# Patient Record
Sex: MALE | Race: WHITE | NOT HISPANIC OR LATINO | Employment: FULL TIME | URBAN - METROPOLITAN AREA
[De-identification: names, ages, dates, MRNs, and addresses within clinical notes are randomized per-mention and may not be internally consistent; named-entity substitution may affect disease eponyms.]

---

## 2023-11-01 ENCOUNTER — APPOINTMENT (EMERGENCY)
Dept: RADIOLOGY | Facility: HOSPITAL | Age: 53
DRG: 371 | End: 2023-11-01
Attending: EMERGENCY MEDICINE
Payer: COMMERCIAL

## 2023-11-01 ENCOUNTER — HOSPITAL ENCOUNTER (INPATIENT)
Facility: HOSPITAL | Age: 53
LOS: 11 days | Discharge: HOME | DRG: 371 | End: 2023-11-12
Attending: EMERGENCY MEDICINE | Admitting: INTERNAL MEDICINE
Payer: COMMERCIAL

## 2023-11-01 DIAGNOSIS — N17.9 AKI (ACUTE KIDNEY INJURY) (CMS/HCC): ICD-10-CM

## 2023-11-01 DIAGNOSIS — E87.1 HYPONATREMIA: Primary | ICD-10-CM

## 2023-11-01 DIAGNOSIS — E86.0 DEHYDRATION: ICD-10-CM

## 2023-11-01 DIAGNOSIS — R19.7 DIARRHEA, UNSPECIFIED TYPE: ICD-10-CM

## 2023-11-01 PROBLEM — J45.909 ASTHMA: Status: ACTIVE | Noted: 2023-11-01

## 2023-11-01 PROBLEM — E11.9 TYPE 2 DIABETES MELLITUS (CMS/HCC): Status: ACTIVE | Noted: 2023-11-01

## 2023-11-01 PROBLEM — E87.29 HIGH ANION GAP METABOLIC ACIDOSIS: Status: ACTIVE | Noted: 2023-11-01

## 2023-11-01 PROBLEM — E78.5 HYPERLIPIDEMIA: Status: ACTIVE | Noted: 2023-11-01

## 2023-11-01 PROBLEM — I10 HYPERTENSION: Status: ACTIVE | Noted: 2023-11-01

## 2023-11-01 LAB
ALBUMIN SERPL-MCNC: 4.2 G/DL (ref 3.5–5.7)
ALP SERPL-CCNC: 95 IU/L (ref 34–125)
ALT SERPL-CCNC: 9 IU/L (ref 7–52)
ANION GAP SERPL CALC-SCNC: 13 MEQ/L (ref 3–15)
ANION GAP SERPL CALC-SCNC: 18 MEQ/L (ref 3–15)
AST SERPL-CCNC: 13 IU/L (ref 13–39)
ATRIAL RATE: 89
BACTERIA URNS QL MICRO: ABNORMAL /HPF
BASOPHILS # BLD: 0.05 K/UL (ref 0.01–0.1)
BASOPHILS NFR BLD: 0.5 %
BILIRUB SERPL-MCNC: 0.3 MG/DL (ref 0.3–1.2)
BILIRUB UR QL STRIP.AUTO: NEGATIVE MG/DL
BUN SERPL-MCNC: 120 MG/DL (ref 7–25)
BUN SERPL-MCNC: 138 MG/DL (ref 7–25)
CALCIUM SERPL-MCNC: 7.7 MG/DL (ref 8.6–10.3)
CALCIUM SERPL-MCNC: 8.7 MG/DL (ref 8.6–10.3)
CHLORIDE SERPL-SCNC: 102 MEQ/L (ref 98–107)
CHLORIDE SERPL-SCNC: 109 MEQ/L (ref 98–107)
CLARITY UR REFRACT.AUTO: CLEAR
CO2 SERPL-SCNC: 11 MEQ/L (ref 21–31)
CO2 SERPL-SCNC: 9 MEQ/L (ref 21–31)
COLOR UR AUTO: YELLOW
CREAT SERPL-MCNC: 8.4 MG/DL (ref 0.7–1.3)
CREAT SERPL-MCNC: 9.4 MG/DL (ref 0.7–1.3)
DIFFERENTIAL METHOD BLD: ABNORMAL
EOSINOPHIL # BLD: 0.45 K/UL (ref 0.04–0.54)
EOSINOPHIL NFR BLD: 4.7 %
ERYTHROCYTE [DISTWIDTH] IN BLOOD BY AUTOMATED COUNT: 14.4 % (ref 11.6–14.4)
FLUAV RNA SPEC QL NAA+PROBE: NEGATIVE
FLUBV RNA SPEC QL NAA+PROBE: NEGATIVE
GFR SERPL CREATININE-BSD FRML MDRD: 5.9 ML/MIN/1.73M*2
GFR SERPL CREATININE-BSD FRML MDRD: 6.7 ML/MIN/1.73M*2
GLUCOSE BLD-MCNC: 149 MG/DL (ref 70–99)
GLUCOSE BLD-MCNC: 228 MG/DL (ref 70–99)
GLUCOSE SERPL-MCNC: 120 MG/DL (ref 70–99)
GLUCOSE SERPL-MCNC: 140 MG/DL (ref 70–99)
GLUCOSE UR STRIP.AUTO-MCNC: ABNORMAL MG/DL
HCT VFR BLDCO AUTO: 43.5 % (ref 40.1–51)
HGB BLD-MCNC: 14.9 G/DL (ref 13.7–17.5)
HGB UR QL STRIP.AUTO: NEGATIVE
HYALINE CASTS #/AREA URNS LPF: ABNORMAL /LPF
IMM GRANULOCYTES # BLD AUTO: 0.05 K/UL (ref 0–0.08)
IMM GRANULOCYTES NFR BLD AUTO: 0.5 %
KETONES UR STRIP.AUTO-MCNC: NEGATIVE MG/DL
LACTATE SERPL-SCNC: 1 MMOL/L (ref 0.4–2)
LEUKOCYTE ESTERASE UR QL STRIP.AUTO: NEGATIVE
LIPASE SERPL-CCNC: 877 U/L (ref 11–82)
LYMPHOCYTES # BLD: 1.37 K/UL (ref 1.2–3.5)
LYMPHOCYTES NFR BLD: 14.3 %
MAGNESIUM SERPL-MCNC: 2.2 MG/DL (ref 1.8–2.5)
MAGNESIUM SERPL-MCNC: 2.7 MG/DL (ref 1.8–2.5)
MCH RBC QN AUTO: 27.3 PG (ref 28–33.2)
MCHC RBC AUTO-ENTMCNC: 34.3 G/DL (ref 32.2–36.5)
MCV RBC AUTO: 79.8 FL (ref 83–98)
MONOCYTES # BLD: 0.83 K/UL (ref 0.3–1)
MONOCYTES NFR BLD: 8.7 %
MUCOUS THREADS URNS QL MICRO: ABNORMAL /LPF
NEUTROPHILS # BLD: 6.82 K/UL (ref 1.7–7)
NEUTS SEG NFR BLD: 71.3 %
NITRITE UR QL STRIP.AUTO: NEGATIVE
NRBC BLD-RTO: 0 %
P AXIS: 56
PDW BLD AUTO: 9.3 FL (ref 9.4–12.4)
PH UR STRIP.AUTO: 5.5 [PH]
PHOSPHATE SERPL-MCNC: 8.2 MG/DL (ref 2.4–4.7)
PHOSPHATE SERPL-MCNC: 9.9 MG/DL (ref 2.4–4.7)
PLATELET # BLD AUTO: 295 K/UL (ref 150–350)
POCT TEST: ABNORMAL
POCT TEST: ABNORMAL
POTASSIUM SERPL-SCNC: 4.4 MEQ/L (ref 3.5–5.1)
POTASSIUM SERPL-SCNC: 5.3 MEQ/L (ref 3.5–5.1)
PR INTERVAL: 180
PROT SERPL-MCNC: 7.8 G/DL (ref 6–8.2)
PROT UR QL STRIP.AUTO: 1
QRS DURATION: 92
QT INTERVAL: 362
QTC CALCULATION(BAZETT): 440
R AXIS: -7
RBC # BLD AUTO: 5.45 M/UL (ref 4.5–5.8)
RBC #/AREA URNS HPF: ABNORMAL /HPF
RSV RNA SPEC QL NAA+PROBE: NEGATIVE
SARS-COV-2 RNA RESP QL NAA+PROBE: NEGATIVE
SODIUM SERPL-SCNC: 129 MEQ/L (ref 136–145)
SODIUM SERPL-SCNC: 133 MEQ/L (ref 136–145)
SP GR UR REFRACT.AUTO: 1.01
SQUAMOUS URNS QL MICRO: ABNORMAL /HPF
T WAVE AXIS: 39
UROBILINOGEN UR STRIP-ACNC: 0.2 EU/DL
VENTRICULAR RATE: 89
WBC # BLD AUTO: 9.57 K/UL (ref 3.8–10.5)
WBC #/AREA URNS HPF: ABNORMAL /HPF

## 2023-11-01 PROCEDURE — 99223 1ST HOSP IP/OBS HIGH 75: CPT | Performed by: STUDENT IN AN ORGANIZED HEALTH CARE EDUCATION/TRAINING PROGRAM

## 2023-11-01 PROCEDURE — 85025 COMPLETE CBC W/AUTO DIFF WBC: CPT | Performed by: EMERGENCY MEDICINE

## 2023-11-01 PROCEDURE — 99291 CRITICAL CARE FIRST HOUR: CPT | Mod: 25

## 2023-11-01 PROCEDURE — 25800000 HC PHARMACY IV SOLUTIONS: Performed by: EMERGENCY MEDICINE

## 2023-11-01 PROCEDURE — 96374 THER/PROPH/DIAG INJ IV PUSH: CPT | Mod: 59

## 2023-11-01 PROCEDURE — 83605 ASSAY OF LACTIC ACID: CPT | Performed by: EMERGENCY MEDICINE

## 2023-11-01 PROCEDURE — 80053 COMPREHEN METABOLIC PANEL: CPT | Performed by: EMERGENCY MEDICINE

## 2023-11-01 PROCEDURE — 83735 ASSAY OF MAGNESIUM: CPT | Performed by: EMERGENCY MEDICINE

## 2023-11-01 PROCEDURE — 36415 COLL VENOUS BLD VENIPUNCTURE: CPT | Performed by: EMERGENCY MEDICINE

## 2023-11-01 PROCEDURE — 81003 URINALYSIS AUTO W/O SCOPE: CPT | Performed by: EMERGENCY MEDICINE

## 2023-11-01 PROCEDURE — 63600000 HC DRUGS/DETAIL CODE: Mod: JZ | Performed by: EMERGENCY MEDICINE

## 2023-11-01 PROCEDURE — 80048 BASIC METABOLIC PNL TOTAL CA: CPT

## 2023-11-01 PROCEDURE — 83690 ASSAY OF LIPASE: CPT | Performed by: EMERGENCY MEDICINE

## 2023-11-01 PROCEDURE — 25000000 HC PHARMACY GENERAL: Performed by: EMERGENCY MEDICINE

## 2023-11-01 PROCEDURE — 21400000 HC ROOM AND CARE CCU/INTERMEDIATE

## 2023-11-01 PROCEDURE — 63600000 HC DRUGS/DETAIL CODE

## 2023-11-01 PROCEDURE — 83735 ASSAY OF MAGNESIUM: CPT

## 2023-11-01 PROCEDURE — G1004 CDSM NDSC: HCPCS

## 2023-11-01 PROCEDURE — 93005 ELECTROCARDIOGRAM TRACING: CPT | Performed by: EMERGENCY MEDICINE

## 2023-11-01 PROCEDURE — 87637 SARSCOV2&INF A&B&RSV AMP PRB: CPT | Performed by: EMERGENCY MEDICINE

## 2023-11-01 PROCEDURE — 99285 EMERGENCY DEPT VISIT HI MDM: CPT | Mod: 25

## 2023-11-01 PROCEDURE — 84100 ASSAY OF PHOSPHORUS: CPT

## 2023-11-01 PROCEDURE — 93010 ELECTROCARDIOGRAM REPORT: CPT | Performed by: INTERNAL MEDICINE

## 2023-11-01 PROCEDURE — 96361 HYDRATE IV INFUSION ADD-ON: CPT

## 2023-11-01 RX ORDER — DEXTROSE 50 % IN WATER (D50W) INTRAVENOUS SYRINGE
25 AS NEEDED
Status: DISCONTINUED | OUTPATIENT
Start: 2023-11-01 | End: 2023-11-12 | Stop reason: HOSPADM

## 2023-11-01 RX ORDER — LISINOPRIL 20 MG/1
20 TABLET ORAL DAILY
COMMUNITY
End: 2023-11-12 | Stop reason: HOSPADM

## 2023-11-01 RX ORDER — ONDANSETRON HYDROCHLORIDE 2 MG/ML
4 INJECTION, SOLUTION INTRAVENOUS ONCE
Status: COMPLETED | OUTPATIENT
Start: 2023-11-01 | End: 2023-11-01

## 2023-11-01 RX ORDER — IBUPROFEN 200 MG
16-32 TABLET ORAL AS NEEDED
Status: DISCONTINUED | OUTPATIENT
Start: 2023-11-01 | End: 2023-11-12 | Stop reason: HOSPADM

## 2023-11-01 RX ORDER — DEXTROSE 40 %
15-30 GEL (GRAM) ORAL AS NEEDED
Status: DISCONTINUED | OUTPATIENT
Start: 2023-11-01 | End: 2023-11-12 | Stop reason: HOSPADM

## 2023-11-01 RX ORDER — INSULIN LISPRO 100 [IU]/ML
3-5 INJECTION, SOLUTION INTRAVENOUS; SUBCUTANEOUS
Status: DISCONTINUED | OUTPATIENT
Start: 2023-11-01 | End: 2023-11-12 | Stop reason: HOSPADM

## 2023-11-01 RX ORDER — LATANOPROST 50 UG/ML
1 SOLUTION/ DROPS OPHTHALMIC NIGHTLY
COMMUNITY

## 2023-11-01 RX ORDER — ACETAMINOPHEN 325 MG/1
650 TABLET ORAL EVERY 4 HOURS PRN
Status: DISCONTINUED | OUTPATIENT
Start: 2023-11-01 | End: 2023-11-12 | Stop reason: HOSPADM

## 2023-11-01 RX ORDER — PIOGLITAZONEHYDROCHLORIDE 15 MG/1
15 TABLET ORAL DAILY
COMMUNITY

## 2023-11-01 RX ORDER — EMPAGLIFLOZIN AND METFORMIN HYDROCHLORIDE 12.5; 1 MG/1; MG/1
1 TABLET ORAL 2 TIMES DAILY
COMMUNITY

## 2023-11-01 RX ORDER — ATORVASTATIN CALCIUM 40 MG/1
40 TABLET, FILM COATED ORAL EVERY EVENING
COMMUNITY

## 2023-11-01 RX ORDER — DULAGLUTIDE 4.5 MG/.5ML
4.5 INJECTION, SOLUTION SUBCUTANEOUS
COMMUNITY

## 2023-11-01 RX ORDER — MONTELUKAST SODIUM 10 MG/1
TABLET ORAL NIGHTLY
COMMUNITY

## 2023-11-01 RX ORDER — HEPARIN SODIUM 5000 [USP'U]/ML
5000 INJECTION, SOLUTION INTRAVENOUS; SUBCUTANEOUS EVERY 8 HOURS
Status: DISCONTINUED | OUTPATIENT
Start: 2023-11-01 | End: 2023-11-12 | Stop reason: HOSPADM

## 2023-11-01 RX ORDER — SODIUM CHLORIDE, SODIUM LACTATE, POTASSIUM CHLORIDE, CALCIUM CHLORIDE 600; 310; 30; 20 MG/100ML; MG/100ML; MG/100ML; MG/100ML
INJECTION, SOLUTION INTRAVENOUS CONTINUOUS
Status: DISCONTINUED | OUTPATIENT
Start: 2023-11-01 | End: 2023-11-01

## 2023-11-01 RX ADMIN — SODIUM BICARBONATE: 84 INJECTION, SOLUTION INTRAVENOUS at 18:00

## 2023-11-01 RX ADMIN — ONDANSETRON 4 MG: 2 INJECTION INTRAMUSCULAR; INTRAVENOUS at 13:52

## 2023-11-01 RX ADMIN — INSULIN LISPRO 3 UNITS: 100 INJECTION, SOLUTION INTRAVENOUS; SUBCUTANEOUS at 23:25

## 2023-11-01 RX ADMIN — SODIUM CHLORIDE 2000 ML: 9 INJECTION, SOLUTION INTRAVENOUS at 13:39

## 2023-11-01 RX ADMIN — HEPARIN SODIUM 5000 UNITS: 5000 INJECTION, SOLUTION INTRAVENOUS; SUBCUTANEOUS at 23:26

## 2023-11-01 RX ADMIN — SODIUM CHLORIDE 1000 ML: 9 INJECTION, SOLUTION INTRAVENOUS at 16:12

## 2023-11-02 LAB
ANION GAP SERPL CALC-SCNC: 10 MEQ/L (ref 3–15)
ANION GAP SERPL CALC-SCNC: 15 MEQ/L (ref 3–15)
BUN SERPL-MCNC: 100 MG/DL (ref 7–25)
BUN SERPL-MCNC: 118 MG/DL (ref 7–25)
C DIFF TOX GENS STL QL NAA+PROBE: NEGATIVE
CALCIUM SERPL-MCNC: 7 MG/DL (ref 8.6–10.3)
CALCIUM SERPL-MCNC: 7.8 MG/DL (ref 8.6–10.3)
CHLORIDE SERPL-SCNC: 100 MEQ/L (ref 98–107)
CHLORIDE SERPL-SCNC: 110 MEQ/L (ref 98–107)
CHLORIDE UR-SCNC: 30 MEQ/L
CO2 SERPL-SCNC: 11 MEQ/L (ref 21–31)
CO2 SERPL-SCNC: 25 MEQ/L (ref 21–31)
CREAT SERPL-MCNC: 6 MG/DL (ref 0.7–1.3)
CREAT SERPL-MCNC: 8 MG/DL (ref 0.7–1.3)
ERYTHROCYTE [DISTWIDTH] IN BLOOD BY AUTOMATED COUNT: 14.1 % (ref 11.6–14.4)
EST. AVERAGE GLUCOSE BLD GHB EST-MCNC: 174 MG/DL
GFR SERPL CREATININE-BSD FRML MDRD: 7.1 ML/MIN/1.73M*2
GFR SERPL CREATININE-BSD FRML MDRD: 9.8 ML/MIN/1.73M*2
GLUCOSE BLD-MCNC: 152 MG/DL (ref 70–99)
GLUCOSE BLD-MCNC: 163 MG/DL (ref 70–99)
GLUCOSE BLD-MCNC: 196 MG/DL (ref 70–99)
GLUCOSE BLD-MCNC: 271 MG/DL (ref 70–99)
GLUCOSE SERPL-MCNC: 131 MG/DL (ref 70–99)
GLUCOSE SERPL-MCNC: 441 MG/DL (ref 70–99)
HBA1C MFR BLD: 7.7 %
HCT VFR BLDCO AUTO: 39 % (ref 40.1–51)
HGB BLD-MCNC: 13.2 G/DL (ref 13.7–17.5)
LABORATORY COMMENT REPORT: NORMAL
MAGNESIUM SERPL-MCNC: 1.8 MG/DL (ref 1.8–2.5)
MAGNESIUM SERPL-MCNC: 2.3 MG/DL (ref 1.8–2.5)
MCH RBC QN AUTO: 27 PG (ref 28–33.2)
MCHC RBC AUTO-ENTMCNC: 33.8 G/DL (ref 32.2–36.5)
MCV RBC AUTO: 79.8 FL (ref 83–98)
PDW BLD AUTO: 9.6 FL (ref 9.4–12.4)
PHOSPHATE SERPL-MCNC: 7.1 MG/DL (ref 2.4–4.7)
PLATELET # BLD AUTO: 280 K/UL (ref 150–350)
POCT TEST: ABNORMAL
POTASSIUM SERPL-SCNC: 3.2 MEQ/L (ref 3.5–5.1)
POTASSIUM SERPL-SCNC: 4.1 MEQ/L (ref 3.5–5.1)
POTASSIUM UR-SCNC: 4.3 MEQ/L
RBC # BLD AUTO: 4.89 M/UL (ref 4.5–5.8)
SODIUM SERPL-SCNC: 135 MEQ/L (ref 136–145)
SODIUM SERPL-SCNC: 136 MEQ/L (ref 136–145)
SODIUM UR-SCNC: 31 MEQ/L
WBC # BLD AUTO: 9.85 K/UL (ref 3.8–10.5)

## 2023-11-02 PROCEDURE — 87045 FECES CULTURE AEROBIC BACT: CPT

## 2023-11-02 PROCEDURE — 84100 ASSAY OF PHOSPHORUS: CPT

## 2023-11-02 PROCEDURE — 83036 HEMOGLOBIN GLYCOSYLATED A1C: CPT

## 2023-11-02 PROCEDURE — 87493 C DIFF AMPLIFIED PROBE: CPT

## 2023-11-02 PROCEDURE — 85027 COMPLETE CBC AUTOMATED: CPT

## 2023-11-02 PROCEDURE — 63600000 HC DRUGS/DETAIL CODE

## 2023-11-02 PROCEDURE — 21400000 HC ROOM AND CARE CCU/INTERMEDIATE

## 2023-11-02 PROCEDURE — 99233 SBSQ HOSP IP/OBS HIGH 50: CPT | Performed by: HOSPITALIST

## 2023-11-02 PROCEDURE — 83735 ASSAY OF MAGNESIUM: CPT

## 2023-11-02 PROCEDURE — 63700000 HC SELF-ADMINISTRABLE DRUG

## 2023-11-02 PROCEDURE — 36415 COLL VENOUS BLD VENIPUNCTURE: CPT

## 2023-11-02 PROCEDURE — 87046 STOOL CULTR AEROBIC BACT EA: CPT

## 2023-11-02 PROCEDURE — 87177 OVA AND PARASITES SMEARS: CPT

## 2023-11-02 PROCEDURE — 25000000 HC PHARMACY GENERAL: Performed by: EMERGENCY MEDICINE

## 2023-11-02 PROCEDURE — 80048 BASIC METABOLIC PNL TOTAL CA: CPT

## 2023-11-02 PROCEDURE — 84133 ASSAY OF URINE POTASSIUM: CPT

## 2023-11-02 PROCEDURE — 25800000 HC PHARMACY IV SOLUTIONS: Performed by: EMERGENCY MEDICINE

## 2023-11-02 PROCEDURE — 63700000 HC SELF-ADMINISTRABLE DRUG: Performed by: STUDENT IN AN ORGANIZED HEALTH CARE EDUCATION/TRAINING PROGRAM

## 2023-11-02 PROCEDURE — 82570 ASSAY OF URINE CREATININE: CPT

## 2023-11-02 RX ORDER — ATORVASTATIN CALCIUM 40 MG/1
40 TABLET, FILM COATED ORAL DAILY
Status: DISCONTINUED | OUTPATIENT
Start: 2023-11-02 | End: 2023-11-12 | Stop reason: HOSPADM

## 2023-11-02 RX ORDER — MONTELUKAST SODIUM 10 MG/1
10 TABLET ORAL NIGHTLY
Status: DISCONTINUED | OUTPATIENT
Start: 2023-11-02 | End: 2023-11-12 | Stop reason: HOSPADM

## 2023-11-02 RX ORDER — POTASSIUM CHLORIDE 14.9 MG/ML
20 INJECTION INTRAVENOUS ONCE
Status: DISCONTINUED | OUTPATIENT
Start: 2023-11-02 | End: 2023-11-02

## 2023-11-02 RX ORDER — POTASSIUM CHLORIDE 750 MG/1
40 TABLET, EXTENDED RELEASE ORAL ONCE
Status: COMPLETED | OUTPATIENT
Start: 2023-11-02 | End: 2023-11-02

## 2023-11-02 RX ORDER — LOPERAMIDE HYDROCHLORIDE 2 MG/1
2 CAPSULE ORAL AS NEEDED
Status: DISCONTINUED | OUTPATIENT
Start: 2023-11-02 | End: 2023-11-12 | Stop reason: HOSPADM

## 2023-11-02 RX ORDER — ALBUTEROL SULFATE 0.83 MG/ML
2.5 SOLUTION RESPIRATORY (INHALATION) EVERY 6 HOURS PRN
Status: DISCONTINUED | OUTPATIENT
Start: 2023-11-02 | End: 2023-11-12 | Stop reason: HOSPADM

## 2023-11-02 RX ADMIN — INSULIN LISPRO 3 UNITS: 100 INJECTION, SOLUTION INTRAVENOUS; SUBCUTANEOUS at 22:36

## 2023-11-02 RX ADMIN — MONTELUKAST 10 MG: 10 TABLET, FILM COATED ORAL at 22:31

## 2023-11-02 RX ADMIN — LOPERAMIDE HYDROCHLORIDE 2 MG: 2 CAPSULE ORAL at 12:01

## 2023-11-02 RX ADMIN — HEPARIN SODIUM 5000 UNITS: 5000 INJECTION, SOLUTION INTRAVENOUS; SUBCUTANEOUS at 06:36

## 2023-11-02 RX ADMIN — SODIUM BICARBONATE: 84 INJECTION, SOLUTION INTRAVENOUS at 17:39

## 2023-11-02 RX ADMIN — HEPARIN SODIUM 5000 UNITS: 5000 INJECTION, SOLUTION INTRAVENOUS; SUBCUTANEOUS at 15:02

## 2023-11-02 RX ADMIN — ATORVASTATIN CALCIUM 40 MG: 40 TABLET, FILM COATED ORAL at 08:31

## 2023-11-02 RX ADMIN — INSULIN LISPRO 3 UNITS: 100 INJECTION, SOLUTION INTRAVENOUS; SUBCUTANEOUS at 12:01

## 2023-11-02 RX ADMIN — SODIUM BICARBONATE: 84 INJECTION, SOLUTION INTRAVENOUS at 05:39

## 2023-11-02 RX ADMIN — POTASSIUM CHLORIDE 40 MEQ: 750 TABLET, EXTENDED RELEASE ORAL at 22:31

## 2023-11-02 RX ADMIN — HEPARIN SODIUM 5000 UNITS: 5000 INJECTION, SOLUTION INTRAVENOUS; SUBCUTANEOUS at 22:31

## 2023-11-03 LAB
ANION GAP SERPL CALC-SCNC: 10 MEQ/L (ref 3–15)
ANION GAP SERPL CALC-SCNC: 14 MEQ/L (ref 3–15)
BUN SERPL-MCNC: 90 MG/DL (ref 7–25)
BUN SERPL-MCNC: 99 MG/DL (ref 7–25)
CALCIUM SERPL-MCNC: 7.7 MG/DL (ref 8.6–10.3)
CALCIUM SERPL-MCNC: 7.8 MG/DL (ref 8.6–10.3)
CHLORIDE SERPL-SCNC: 107 MEQ/L (ref 98–107)
CHLORIDE SERPL-SCNC: 109 MEQ/L (ref 98–107)
CO2 SERPL-SCNC: 20 MEQ/L (ref 21–31)
CO2 SERPL-SCNC: 22 MEQ/L (ref 21–31)
CREAT SERPL-MCNC: 5.3 MG/DL (ref 0.7–1.3)
CREAT SERPL-MCNC: 6.1 MG/DL (ref 0.7–1.3)
CREAT UR-MCNC: 56.8 MG/DL
CREAT UR-MCNC: 57.7 MG/DL
ERYTHROCYTE [DISTWIDTH] IN BLOOD BY AUTOMATED COUNT: 14 % (ref 11.6–14.4)
GFR SERPL CREATININE-BSD FRML MDRD: 11.4 ML/MIN/1.73M*2
GFR SERPL CREATININE-BSD FRML MDRD: 9.7 ML/MIN/1.73M*2
GLUCOSE BLD-MCNC: 140 MG/DL (ref 70–99)
GLUCOSE BLD-MCNC: 156 MG/DL (ref 70–99)
GLUCOSE BLD-MCNC: 176 MG/DL (ref 70–99)
GLUCOSE BLD-MCNC: 256 MG/DL (ref 70–99)
GLUCOSE SERPL-MCNC: 126 MG/DL (ref 70–99)
GLUCOSE SERPL-MCNC: 187 MG/DL (ref 70–99)
HCT VFR BLDCO AUTO: 36.4 % (ref 40.1–51)
HGB BLD-MCNC: 12.7 G/DL (ref 13.7–17.5)
MAGNESIUM SERPL-MCNC: 1.9 MG/DL (ref 1.8–2.5)
MAGNESIUM SERPL-MCNC: 2 MG/DL (ref 1.8–2.5)
MCH RBC QN AUTO: 27.7 PG (ref 28–33.2)
MCHC RBC AUTO-ENTMCNC: 34.9 G/DL (ref 32.2–36.5)
MCV RBC AUTO: 79.3 FL (ref 83–98)
O+P SPEC MICRO: NORMAL
PDW BLD AUTO: 9.6 FL (ref 9.4–12.4)
PHOSPHATE SERPL-MCNC: 5.3 MG/DL (ref 2.4–4.7)
PLATELET # BLD AUTO: 272 K/UL (ref 150–350)
POCT TEST: ABNORMAL
POTASSIUM SERPL-SCNC: 3.4 MEQ/L (ref 3.5–5.1)
POTASSIUM SERPL-SCNC: 3.6 MEQ/L (ref 3.5–5.1)
RBC # BLD AUTO: 4.59 M/UL (ref 4.5–5.8)
SODIUM SERPL-SCNC: 141 MEQ/L (ref 136–145)
SODIUM SERPL-SCNC: 141 MEQ/L (ref 136–145)
UUN UR-MCNC: 366 MG/DL
WBC # BLD AUTO: 8.21 K/UL (ref 3.8–10.5)

## 2023-11-03 PROCEDURE — 82570 ASSAY OF URINE CREATININE: CPT | Performed by: INTERNAL MEDICINE

## 2023-11-03 PROCEDURE — 63700000 HC SELF-ADMINISTRABLE DRUG

## 2023-11-03 PROCEDURE — 99233 SBSQ HOSP IP/OBS HIGH 50: CPT | Performed by: HOSPITALIST

## 2023-11-03 PROCEDURE — 21400000 HC ROOM AND CARE CCU/INTERMEDIATE

## 2023-11-03 PROCEDURE — 80048 BASIC METABOLIC PNL TOTAL CA: CPT

## 2023-11-03 PROCEDURE — 25000000 HC PHARMACY GENERAL: Performed by: EMERGENCY MEDICINE

## 2023-11-03 PROCEDURE — 25800000 HC PHARMACY IV SOLUTIONS: Performed by: EMERGENCY MEDICINE

## 2023-11-03 PROCEDURE — 83735 ASSAY OF MAGNESIUM: CPT

## 2023-11-03 PROCEDURE — 84540 ASSAY OF URINE/UREA-N: CPT | Performed by: INTERNAL MEDICINE

## 2023-11-03 PROCEDURE — 63600000 HC DRUGS/DETAIL CODE

## 2023-11-03 PROCEDURE — 36415 COLL VENOUS BLD VENIPUNCTURE: CPT

## 2023-11-03 PROCEDURE — 84100 ASSAY OF PHOSPHORUS: CPT

## 2023-11-03 PROCEDURE — 85027 COMPLETE CBC AUTOMATED: CPT

## 2023-11-03 RX ORDER — INSULIN LISPRO 100 [IU]/ML
2 INJECTION, SOLUTION INTRAVENOUS; SUBCUTANEOUS
Status: DISCONTINUED | OUTPATIENT
Start: 2023-11-03 | End: 2023-11-12 | Stop reason: HOSPADM

## 2023-11-03 RX ORDER — POTASSIUM CHLORIDE 750 MG/1
20 TABLET, EXTENDED RELEASE ORAL ONCE
Status: COMPLETED | OUTPATIENT
Start: 2023-11-03 | End: 2023-11-03

## 2023-11-03 RX ORDER — SODIUM CHLORIDE, SODIUM LACTATE, POTASSIUM CHLORIDE, CALCIUM CHLORIDE 600; 310; 30; 20 MG/100ML; MG/100ML; MG/100ML; MG/100ML
INJECTION, SOLUTION INTRAVENOUS CONTINUOUS
Status: ACTIVE | OUTPATIENT
Start: 2023-11-03 | End: 2023-11-04

## 2023-11-03 RX ADMIN — SODIUM CHLORIDE, POTASSIUM CHLORIDE, SODIUM LACTATE AND CALCIUM CHLORIDE: 600; 310; 30; 20 INJECTION, SOLUTION INTRAVENOUS at 23:58

## 2023-11-03 RX ADMIN — INSULIN LISPRO 2 UNITS: 100 INJECTION, SOLUTION INTRAVENOUS; SUBCUTANEOUS at 11:59

## 2023-11-03 RX ADMIN — POTASSIUM CHLORIDE 20 MEQ: 750 TABLET, EXTENDED RELEASE ORAL at 22:54

## 2023-11-03 RX ADMIN — HEPARIN SODIUM 5000 UNITS: 5000 INJECTION, SOLUTION INTRAVENOUS; SUBCUTANEOUS at 22:18

## 2023-11-03 RX ADMIN — MONTELUKAST 10 MG: 10 TABLET, FILM COATED ORAL at 22:18

## 2023-11-03 RX ADMIN — SODIUM CHLORIDE, POTASSIUM CHLORIDE, SODIUM LACTATE AND CALCIUM CHLORIDE: 600; 310; 30; 20 INJECTION, SOLUTION INTRAVENOUS at 16:34

## 2023-11-03 RX ADMIN — ACETAMINOPHEN 650 MG: 325 TABLET ORAL at 06:24

## 2023-11-03 RX ADMIN — POTASSIUM CHLORIDE 20 MEQ: 750 TABLET, EXTENDED RELEASE ORAL at 08:51

## 2023-11-03 RX ADMIN — SODIUM CHLORIDE, POTASSIUM CHLORIDE, SODIUM LACTATE AND CALCIUM CHLORIDE: 600; 310; 30; 20 INJECTION, SOLUTION INTRAVENOUS at 08:52

## 2023-11-03 RX ADMIN — INSULIN LISPRO 2 UNITS: 100 INJECTION, SOLUTION INTRAVENOUS; SUBCUTANEOUS at 17:55

## 2023-11-03 RX ADMIN — INSULIN LISPRO 3 UNITS: 100 INJECTION, SOLUTION INTRAVENOUS; SUBCUTANEOUS at 11:59

## 2023-11-03 RX ADMIN — HEPARIN SODIUM 5000 UNITS: 5000 INJECTION, SOLUTION INTRAVENOUS; SUBCUTANEOUS at 14:46

## 2023-11-03 RX ADMIN — INSULIN LISPRO 2 UNITS: 100 INJECTION, SOLUTION INTRAVENOUS; SUBCUTANEOUS at 08:52

## 2023-11-03 RX ADMIN — SODIUM BICARBONATE: 84 INJECTION, SOLUTION INTRAVENOUS at 03:04

## 2023-11-03 RX ADMIN — ATORVASTATIN CALCIUM 40 MG: 40 TABLET, FILM COATED ORAL at 08:52

## 2023-11-03 RX ADMIN — HEPARIN SODIUM 5000 UNITS: 5000 INJECTION, SOLUTION INTRAVENOUS; SUBCUTANEOUS at 06:24

## 2023-11-04 LAB
ANION GAP SERPL CALC-SCNC: 12 MEQ/L (ref 3–15)
ANION GAP SERPL CALC-SCNC: 9 MEQ/L (ref 3–15)
BACTERIA STL CULT: NORMAL
BUN SERPL-MCNC: 67 MG/DL (ref 7–25)
BUN SERPL-MCNC: 77 MG/DL (ref 7–25)
CALCIUM SERPL-MCNC: 8 MG/DL (ref 8.6–10.3)
CALCIUM SERPL-MCNC: 8.2 MG/DL (ref 8.6–10.3)
CHLORIDE SERPL-SCNC: 109 MEQ/L (ref 98–107)
CHLORIDE SERPL-SCNC: 111 MEQ/L (ref 98–107)
CO2 SERPL-SCNC: 22 MEQ/L (ref 21–31)
CO2 SERPL-SCNC: 24 MEQ/L (ref 21–31)
CREAT SERPL-MCNC: 3.7 MG/DL (ref 0.7–1.3)
CREAT SERPL-MCNC: 4.8 MG/DL (ref 0.7–1.3)
ERYTHROCYTE [DISTWIDTH] IN BLOOD BY AUTOMATED COUNT: 14.2 % (ref 11.6–14.4)
GFR SERPL CREATININE-BSD FRML MDRD: 12.7 ML/MIN/1.73M*2
GFR SERPL CREATININE-BSD FRML MDRD: 17.2 ML/MIN/1.73M*2
GLUCOSE BLD-MCNC: 126 MG/DL (ref 70–99)
GLUCOSE BLD-MCNC: 143 MG/DL (ref 70–99)
GLUCOSE BLD-MCNC: 155 MG/DL (ref 70–99)
GLUCOSE BLD-MCNC: 200 MG/DL (ref 70–99)
GLUCOSE SERPL-MCNC: 110 MG/DL (ref 70–99)
GLUCOSE SERPL-MCNC: 217 MG/DL (ref 70–99)
HCT VFR BLDCO AUTO: 37.2 % (ref 40.1–51)
HGB BLD-MCNC: 12.6 G/DL (ref 13.7–17.5)
MAGNESIUM SERPL-MCNC: 1.8 MG/DL (ref 1.8–2.5)
MAGNESIUM SERPL-MCNC: 1.8 MG/DL (ref 1.8–2.5)
MCH RBC QN AUTO: 27.3 PG (ref 28–33.2)
MCHC RBC AUTO-ENTMCNC: 33.9 G/DL (ref 32.2–36.5)
MCV RBC AUTO: 80.5 FL (ref 83–98)
PDW BLD AUTO: 9.7 FL (ref 9.4–12.4)
PHOSPHATE SERPL-MCNC: 4.4 MG/DL (ref 2.4–4.7)
PLATELET # BLD AUTO: 288 K/UL (ref 150–350)
POCT TEST: ABNORMAL
POTASSIUM SERPL-SCNC: 3.6 MEQ/L (ref 3.5–5.1)
POTASSIUM SERPL-SCNC: 4 MEQ/L (ref 3.5–5.1)
RBC # BLD AUTO: 4.62 M/UL (ref 4.5–5.8)
SODIUM SERPL-SCNC: 142 MEQ/L (ref 136–145)
SODIUM SERPL-SCNC: 145 MEQ/L (ref 136–145)
WBC # BLD AUTO: 8.92 K/UL (ref 3.8–10.5)

## 2023-11-04 PROCEDURE — 84100 ASSAY OF PHOSPHORUS: CPT

## 2023-11-04 PROCEDURE — 80048 BASIC METABOLIC PNL TOTAL CA: CPT

## 2023-11-04 PROCEDURE — 36415 COLL VENOUS BLD VENIPUNCTURE: CPT

## 2023-11-04 PROCEDURE — 63600000 HC DRUGS/DETAIL CODE

## 2023-11-04 PROCEDURE — 63700000 HC SELF-ADMINISTRABLE DRUG

## 2023-11-04 PROCEDURE — 63600000 HC DRUGS/DETAIL CODE: Mod: JZ

## 2023-11-04 PROCEDURE — 21400000 HC ROOM AND CARE CCU/INTERMEDIATE

## 2023-11-04 PROCEDURE — 83735 ASSAY OF MAGNESIUM: CPT

## 2023-11-04 PROCEDURE — 99233 SBSQ HOSP IP/OBS HIGH 50: CPT | Performed by: STUDENT IN AN ORGANIZED HEALTH CARE EDUCATION/TRAINING PROGRAM

## 2023-11-04 PROCEDURE — 85027 COMPLETE CBC AUTOMATED: CPT

## 2023-11-04 RX ORDER — SODIUM CHLORIDE, SODIUM LACTATE, POTASSIUM CHLORIDE, CALCIUM CHLORIDE 600; 310; 30; 20 MG/100ML; MG/100ML; MG/100ML; MG/100ML
INJECTION, SOLUTION INTRAVENOUS CONTINUOUS
Status: ACTIVE | OUTPATIENT
Start: 2023-11-04 | End: 2023-11-05

## 2023-11-04 RX ADMIN — INSULIN LISPRO 2 UNITS: 100 INJECTION, SOLUTION INTRAVENOUS; SUBCUTANEOUS at 08:55

## 2023-11-04 RX ADMIN — INSULIN LISPRO 3 UNITS: 100 INJECTION, SOLUTION INTRAVENOUS; SUBCUTANEOUS at 12:30

## 2023-11-04 RX ADMIN — MAGNESIUM SULFATE HEPTAHYDRATE 2 G: 40 INJECTION, SOLUTION INTRAVENOUS at 23:08

## 2023-11-04 RX ADMIN — INSULIN LISPRO 2 UNITS: 100 INJECTION, SOLUTION INTRAVENOUS; SUBCUTANEOUS at 18:07

## 2023-11-04 RX ADMIN — INSULIN LISPRO 2 UNITS: 100 INJECTION, SOLUTION INTRAVENOUS; SUBCUTANEOUS at 12:30

## 2023-11-04 RX ADMIN — SODIUM CHLORIDE, POTASSIUM CHLORIDE, SODIUM LACTATE AND CALCIUM CHLORIDE: 600; 310; 30; 20 INJECTION, SOLUTION INTRAVENOUS at 18:09

## 2023-11-04 RX ADMIN — MONTELUKAST 10 MG: 10 TABLET, FILM COATED ORAL at 21:47

## 2023-11-04 RX ADMIN — HEPARIN SODIUM 5000 UNITS: 5000 INJECTION, SOLUTION INTRAVENOUS; SUBCUTANEOUS at 05:12

## 2023-11-04 RX ADMIN — HEPARIN SODIUM 5000 UNITS: 5000 INJECTION, SOLUTION INTRAVENOUS; SUBCUTANEOUS at 13:20

## 2023-11-04 RX ADMIN — HEPARIN SODIUM 5000 UNITS: 5000 INJECTION, SOLUTION INTRAVENOUS; SUBCUTANEOUS at 21:47

## 2023-11-04 RX ADMIN — ATORVASTATIN CALCIUM 40 MG: 40 TABLET, FILM COATED ORAL at 08:55

## 2023-11-04 RX ADMIN — SODIUM CHLORIDE, POTASSIUM CHLORIDE, SODIUM LACTATE AND CALCIUM CHLORIDE: 600; 310; 30; 20 INJECTION, SOLUTION INTRAVENOUS at 10:33

## 2023-11-05 LAB
ANION GAP SERPL CALC-SCNC: 11 MEQ/L (ref 3–15)
ANION GAP SERPL CALC-SCNC: 8 MEQ/L (ref 3–15)
BUN SERPL-MCNC: 48 MG/DL (ref 7–25)
BUN SERPL-MCNC: 53 MG/DL (ref 7–25)
CALCIUM SERPL-MCNC: 8.4 MG/DL (ref 8.6–10.3)
CALCIUM SERPL-MCNC: 8.6 MG/DL (ref 8.6–10.3)
CHLORIDE SERPL-SCNC: 108 MEQ/L (ref 98–107)
CHLORIDE SERPL-SCNC: 109 MEQ/L (ref 98–107)
CO2 SERPL-SCNC: 24 MEQ/L (ref 21–31)
CO2 SERPL-SCNC: 25 MEQ/L (ref 21–31)
CREAT SERPL-MCNC: 3 MG/DL (ref 0.7–1.3)
CREAT SERPL-MCNC: 3.4 MG/DL (ref 0.7–1.3)
ERYTHROCYTE [DISTWIDTH] IN BLOOD BY AUTOMATED COUNT: 13.8 % (ref 11.6–14.4)
GFR SERPL CREATININE-BSD FRML MDRD: 19 ML/MIN/1.73M*2
GFR SERPL CREATININE-BSD FRML MDRD: 21.9 ML/MIN/1.73M*2
GLUCOSE BLD-MCNC: 100 MG/DL (ref 70–99)
GLUCOSE BLD-MCNC: 160 MG/DL (ref 70–99)
GLUCOSE BLD-MCNC: 209 MG/DL (ref 70–99)
GLUCOSE BLD-MCNC: 215 MG/DL (ref 70–99)
GLUCOSE SERPL-MCNC: 141 MG/DL (ref 70–99)
GLUCOSE SERPL-MCNC: 160 MG/DL (ref 70–99)
HCT VFR BLDCO AUTO: 36.2 % (ref 40.1–51)
HGB BLD-MCNC: 12.3 G/DL (ref 13.7–17.5)
MAGNESIUM SERPL-MCNC: 2.1 MG/DL (ref 1.8–2.5)
MAGNESIUM SERPL-MCNC: 2.1 MG/DL (ref 1.8–2.5)
MCH RBC QN AUTO: 27.6 PG (ref 28–33.2)
MCHC RBC AUTO-ENTMCNC: 34 G/DL (ref 32.2–36.5)
MCV RBC AUTO: 81.2 FL (ref 83–98)
PDW BLD AUTO: 9.5 FL (ref 9.4–12.4)
PHOSPHATE SERPL-MCNC: 3.5 MG/DL (ref 2.4–4.7)
PLATELET # BLD AUTO: 278 K/UL (ref 150–350)
POCT TEST: ABNORMAL
POTASSIUM SERPL-SCNC: 3.9 MEQ/L (ref 3.5–5.1)
POTASSIUM SERPL-SCNC: 4 MEQ/L (ref 3.5–5.1)
RBC # BLD AUTO: 4.46 M/UL (ref 4.5–5.8)
SODIUM SERPL-SCNC: 142 MEQ/L (ref 136–145)
SODIUM SERPL-SCNC: 143 MEQ/L (ref 136–145)
WBC # BLD AUTO: 11.24 K/UL (ref 3.8–10.5)

## 2023-11-05 PROCEDURE — 36415 COLL VENOUS BLD VENIPUNCTURE: CPT

## 2023-11-05 PROCEDURE — 99233 SBSQ HOSP IP/OBS HIGH 50: CPT | Performed by: STUDENT IN AN ORGANIZED HEALTH CARE EDUCATION/TRAINING PROGRAM

## 2023-11-05 PROCEDURE — 63600000 HC DRUGS/DETAIL CODE

## 2023-11-05 PROCEDURE — 63700000 HC SELF-ADMINISTRABLE DRUG

## 2023-11-05 PROCEDURE — 21400000 HC ROOM AND CARE CCU/INTERMEDIATE

## 2023-11-05 PROCEDURE — 84100 ASSAY OF PHOSPHORUS: CPT

## 2023-11-05 PROCEDURE — 80048 BASIC METABOLIC PNL TOTAL CA: CPT

## 2023-11-05 PROCEDURE — 85027 COMPLETE CBC AUTOMATED: CPT

## 2023-11-05 PROCEDURE — 83735 ASSAY OF MAGNESIUM: CPT

## 2023-11-05 RX ADMIN — HEPARIN SODIUM 5000 UNITS: 5000 INJECTION, SOLUTION INTRAVENOUS; SUBCUTANEOUS at 14:38

## 2023-11-05 RX ADMIN — INSULIN LISPRO 2 UNITS: 100 INJECTION, SOLUTION INTRAVENOUS; SUBCUTANEOUS at 17:52

## 2023-11-05 RX ADMIN — MONTELUKAST 10 MG: 10 TABLET, FILM COATED ORAL at 21:42

## 2023-11-05 RX ADMIN — HEPARIN SODIUM 5000 UNITS: 5000 INJECTION, SOLUTION INTRAVENOUS; SUBCUTANEOUS at 21:42

## 2023-11-05 RX ADMIN — INSULIN LISPRO 2 UNITS: 100 INJECTION, SOLUTION INTRAVENOUS; SUBCUTANEOUS at 08:39

## 2023-11-05 RX ADMIN — INSULIN LISPRO 3 UNITS: 100 INJECTION, SOLUTION INTRAVENOUS; SUBCUTANEOUS at 21:42

## 2023-11-05 RX ADMIN — INSULIN LISPRO 2 UNITS: 100 INJECTION, SOLUTION INTRAVENOUS; SUBCUTANEOUS at 12:25

## 2023-11-05 RX ADMIN — ATORVASTATIN CALCIUM 40 MG: 40 TABLET, FILM COATED ORAL at 08:37

## 2023-11-05 RX ADMIN — HEPARIN SODIUM 5000 UNITS: 5000 INJECTION, SOLUTION INTRAVENOUS; SUBCUTANEOUS at 06:51

## 2023-11-05 RX ADMIN — INSULIN LISPRO 3 UNITS: 100 INJECTION, SOLUTION INTRAVENOUS; SUBCUTANEOUS at 12:25

## 2023-11-06 LAB
ANION GAP SERPL CALC-SCNC: 9 MEQ/L (ref 3–15)
ANION GAP SERPL CALC-SCNC: 9 MEQ/L (ref 3–15)
BUN SERPL-MCNC: 37 MG/DL (ref 7–25)
BUN SERPL-MCNC: 41 MG/DL (ref 7–25)
CALCIUM SERPL-MCNC: 8.4 MG/DL (ref 8.6–10.3)
CALCIUM SERPL-MCNC: 8.5 MG/DL (ref 8.6–10.3)
CHLORIDE SERPL-SCNC: 107 MEQ/L (ref 98–107)
CHLORIDE SERPL-SCNC: 109 MEQ/L (ref 98–107)
CO2 SERPL-SCNC: 25 MEQ/L (ref 21–31)
CO2 SERPL-SCNC: 25 MEQ/L (ref 21–31)
CREAT SERPL-MCNC: 2.7 MG/DL (ref 0.7–1.3)
CREAT SERPL-MCNC: 2.9 MG/DL (ref 0.7–1.3)
ERYTHROCYTE [DISTWIDTH] IN BLOOD BY AUTOMATED COUNT: 13.8 % (ref 11.6–14.4)
GFR SERPL CREATININE-BSD FRML MDRD: 22.8 ML/MIN/1.73M*2
GFR SERPL CREATININE-BSD FRML MDRD: 24.7 ML/MIN/1.73M*2
GLUCOSE BLD-MCNC: 116 MG/DL (ref 70–99)
GLUCOSE BLD-MCNC: 119 MG/DL (ref 70–99)
GLUCOSE BLD-MCNC: 133 MG/DL (ref 70–99)
GLUCOSE BLD-MCNC: 206 MG/DL (ref 70–99)
GLUCOSE SERPL-MCNC: 179 MG/DL (ref 70–99)
GLUCOSE SERPL-MCNC: 99 MG/DL (ref 70–99)
HCT VFR BLDCO AUTO: 37.4 % (ref 40.1–51)
HGB BLD-MCNC: 12.6 G/DL (ref 13.7–17.5)
MAGNESIUM SERPL-MCNC: 1.7 MG/DL (ref 1.8–2.5)
MAGNESIUM SERPL-MCNC: 1.9 MG/DL (ref 1.8–2.5)
MCH RBC QN AUTO: 27.2 PG (ref 28–33.2)
MCHC RBC AUTO-ENTMCNC: 33.7 G/DL (ref 32.2–36.5)
MCV RBC AUTO: 80.6 FL (ref 83–98)
PDW BLD AUTO: 9.6 FL (ref 9.4–12.4)
PHOSPHATE SERPL-MCNC: 3.9 MG/DL (ref 2.4–4.7)
PLATELET # BLD AUTO: 293 K/UL (ref 150–350)
POCT TEST: ABNORMAL
POTASSIUM SERPL-SCNC: 3.8 MEQ/L (ref 3.5–5.1)
POTASSIUM SERPL-SCNC: 3.8 MEQ/L (ref 3.5–5.1)
RBC # BLD AUTO: 4.64 M/UL (ref 4.5–5.8)
SODIUM SERPL-SCNC: 141 MEQ/L (ref 136–145)
SODIUM SERPL-SCNC: 143 MEQ/L (ref 136–145)
WBC # BLD AUTO: 12.67 K/UL (ref 3.8–10.5)

## 2023-11-06 PROCEDURE — 63600000 HC DRUGS/DETAIL CODE: Mod: JZ | Performed by: STUDENT IN AN ORGANIZED HEALTH CARE EDUCATION/TRAINING PROGRAM

## 2023-11-06 PROCEDURE — 21400000 HC ROOM AND CARE CCU/INTERMEDIATE

## 2023-11-06 PROCEDURE — 99233 SBSQ HOSP IP/OBS HIGH 50: CPT | Performed by: STUDENT IN AN ORGANIZED HEALTH CARE EDUCATION/TRAINING PROGRAM

## 2023-11-06 PROCEDURE — 83735 ASSAY OF MAGNESIUM: CPT

## 2023-11-06 PROCEDURE — 63700000 HC SELF-ADMINISTRABLE DRUG

## 2023-11-06 PROCEDURE — 63600000 HC DRUGS/DETAIL CODE

## 2023-11-06 PROCEDURE — 84100 ASSAY OF PHOSPHORUS: CPT

## 2023-11-06 PROCEDURE — 36415 COLL VENOUS BLD VENIPUNCTURE: CPT

## 2023-11-06 PROCEDURE — 80048 BASIC METABOLIC PNL TOTAL CA: CPT

## 2023-11-06 PROCEDURE — 85027 COMPLETE CBC AUTOMATED: CPT

## 2023-11-06 RX ORDER — SODIUM CHLORIDE, SODIUM LACTATE, POTASSIUM CHLORIDE, CALCIUM CHLORIDE 600; 310; 30; 20 MG/100ML; MG/100ML; MG/100ML; MG/100ML
INJECTION, SOLUTION INTRAVENOUS CONTINUOUS
Status: DISCONTINUED | OUTPATIENT
Start: 2023-11-06 | End: 2023-11-12 | Stop reason: HOSPADM

## 2023-11-06 RX ADMIN — INSULIN LISPRO 2 UNITS: 100 INJECTION, SOLUTION INTRAVENOUS; SUBCUTANEOUS at 17:23

## 2023-11-06 RX ADMIN — INSULIN LISPRO 2 UNITS: 100 INJECTION, SOLUTION INTRAVENOUS; SUBCUTANEOUS at 08:36

## 2023-11-06 RX ADMIN — SODIUM CHLORIDE, POTASSIUM CHLORIDE, SODIUM LACTATE AND CALCIUM CHLORIDE: 600; 310; 30; 20 INJECTION, SOLUTION INTRAVENOUS at 17:24

## 2023-11-06 RX ADMIN — SODIUM CHLORIDE, POTASSIUM CHLORIDE, SODIUM LACTATE AND CALCIUM CHLORIDE: 600; 310; 30; 20 INJECTION, SOLUTION INTRAVENOUS at 08:36

## 2023-11-06 RX ADMIN — INSULIN LISPRO 2 UNITS: 100 INJECTION, SOLUTION INTRAVENOUS; SUBCUTANEOUS at 12:23

## 2023-11-06 RX ADMIN — INSULIN LISPRO 3 UNITS: 100 INJECTION, SOLUTION INTRAVENOUS; SUBCUTANEOUS at 12:22

## 2023-11-06 RX ADMIN — HEPARIN SODIUM 5000 UNITS: 5000 INJECTION, SOLUTION INTRAVENOUS; SUBCUTANEOUS at 05:45

## 2023-11-06 RX ADMIN — HEPARIN SODIUM 5000 UNITS: 5000 INJECTION, SOLUTION INTRAVENOUS; SUBCUTANEOUS at 14:15

## 2023-11-06 RX ADMIN — HEPARIN SODIUM 5000 UNITS: 5000 INJECTION, SOLUTION INTRAVENOUS; SUBCUTANEOUS at 21:19

## 2023-11-06 RX ADMIN — ATORVASTATIN CALCIUM 40 MG: 40 TABLET, FILM COATED ORAL at 08:33

## 2023-11-06 RX ADMIN — MONTELUKAST 10 MG: 10 TABLET, FILM COATED ORAL at 21:19

## 2023-11-07 LAB
ANION GAP SERPL CALC-SCNC: 11 MEQ/L (ref 3–15)
ANION GAP SERPL CALC-SCNC: 9 MEQ/L (ref 3–15)
BUN SERPL-MCNC: 26 MG/DL (ref 7–25)
BUN SERPL-MCNC: 31 MG/DL (ref 7–25)
CALCIUM SERPL-MCNC: 8 MG/DL (ref 8.6–10.3)
CALCIUM SERPL-MCNC: 8.3 MG/DL (ref 8.6–10.3)
CHLORIDE SERPL-SCNC: 107 MEQ/L (ref 98–107)
CHLORIDE SERPL-SCNC: 107 MEQ/L (ref 98–107)
CO2 SERPL-SCNC: 25 MEQ/L (ref 21–31)
CO2 SERPL-SCNC: 25 MEQ/L (ref 21–31)
CREAT SERPL-MCNC: 2.4 MG/DL (ref 0.7–1.3)
CREAT SERPL-MCNC: 2.6 MG/DL (ref 0.7–1.3)
ERYTHROCYTE [DISTWIDTH] IN BLOOD BY AUTOMATED COUNT: 13.7 % (ref 11.6–14.4)
GFR SERPL CREATININE-BSD FRML MDRD: 25.9 ML/MIN/1.73M*2
GFR SERPL CREATININE-BSD FRML MDRD: 28.4 ML/MIN/1.73M*2
GLUCOSE BLD-MCNC: 123 MG/DL (ref 70–99)
GLUCOSE BLD-MCNC: 126 MG/DL (ref 70–99)
GLUCOSE BLD-MCNC: 128 MG/DL (ref 70–99)
GLUCOSE BLD-MCNC: 148 MG/DL (ref 70–99)
GLUCOSE SERPL-MCNC: 108 MG/DL (ref 70–99)
GLUCOSE SERPL-MCNC: 164 MG/DL (ref 70–99)
HCT VFR BLDCO AUTO: 33.6 % (ref 40.1–51)
HGB BLD-MCNC: 11.1 G/DL (ref 13.7–17.5)
MAGNESIUM SERPL-MCNC: 1.6 MG/DL (ref 1.8–2.5)
MAGNESIUM SERPL-MCNC: 1.9 MG/DL (ref 1.8–2.5)
MCH RBC QN AUTO: 26.9 PG (ref 28–33.2)
MCHC RBC AUTO-ENTMCNC: 33 G/DL (ref 32.2–36.5)
MCV RBC AUTO: 81.4 FL (ref 83–98)
PDW BLD AUTO: 9.7 FL (ref 9.4–12.4)
PHOSPHATE SERPL-MCNC: 3.7 MG/DL (ref 2.4–4.7)
PLATELET # BLD AUTO: 275 K/UL (ref 150–350)
POCT TEST: ABNORMAL
POTASSIUM SERPL-SCNC: 3.6 MEQ/L (ref 3.5–5.1)
POTASSIUM SERPL-SCNC: 3.8 MEQ/L (ref 3.5–5.1)
RBC # BLD AUTO: 4.13 M/UL (ref 4.5–5.8)
SODIUM SERPL-SCNC: 141 MEQ/L (ref 136–145)
SODIUM SERPL-SCNC: 143 MEQ/L (ref 136–145)
WBC # BLD AUTO: 12.68 K/UL (ref 3.8–10.5)

## 2023-11-07 PROCEDURE — 21400000 HC ROOM AND CARE CCU/INTERMEDIATE

## 2023-11-07 PROCEDURE — 36415 COLL VENOUS BLD VENIPUNCTURE: CPT

## 2023-11-07 PROCEDURE — 63600000 HC DRUGS/DETAIL CODE

## 2023-11-07 PROCEDURE — 63600000 HC DRUGS/DETAIL CODE: Mod: JZ | Performed by: STUDENT IN AN ORGANIZED HEALTH CARE EDUCATION/TRAINING PROGRAM

## 2023-11-07 PROCEDURE — 99233 SBSQ HOSP IP/OBS HIGH 50: CPT | Performed by: STUDENT IN AN ORGANIZED HEALTH CARE EDUCATION/TRAINING PROGRAM

## 2023-11-07 PROCEDURE — 83735 ASSAY OF MAGNESIUM: CPT

## 2023-11-07 PROCEDURE — 80048 BASIC METABOLIC PNL TOTAL CA: CPT

## 2023-11-07 PROCEDURE — 85027 COMPLETE CBC AUTOMATED: CPT

## 2023-11-07 PROCEDURE — 63600000 HC DRUGS/DETAIL CODE: Mod: JZ

## 2023-11-07 PROCEDURE — 84100 ASSAY OF PHOSPHORUS: CPT

## 2023-11-07 PROCEDURE — 63700000 HC SELF-ADMINISTRABLE DRUG

## 2023-11-07 PROCEDURE — 97165 OT EVAL LOW COMPLEX 30 MIN: CPT | Mod: GO

## 2023-11-07 RX ADMIN — INSULIN LISPRO 2 UNITS: 100 INJECTION, SOLUTION INTRAVENOUS; SUBCUTANEOUS at 16:58

## 2023-11-07 RX ADMIN — HEPARIN SODIUM 5000 UNITS: 5000 INJECTION, SOLUTION INTRAVENOUS; SUBCUTANEOUS at 14:46

## 2023-11-07 RX ADMIN — HEPARIN SODIUM 5000 UNITS: 5000 INJECTION, SOLUTION INTRAVENOUS; SUBCUTANEOUS at 05:15

## 2023-11-07 RX ADMIN — INSULIN LISPRO 2 UNITS: 100 INJECTION, SOLUTION INTRAVENOUS; SUBCUTANEOUS at 12:27

## 2023-11-07 RX ADMIN — SODIUM CHLORIDE, POTASSIUM CHLORIDE, SODIUM LACTATE AND CALCIUM CHLORIDE: 600; 310; 30; 20 INJECTION, SOLUTION INTRAVENOUS at 13:38

## 2023-11-07 RX ADMIN — SODIUM CHLORIDE, POTASSIUM CHLORIDE, SODIUM LACTATE AND CALCIUM CHLORIDE: 600; 310; 30; 20 INJECTION, SOLUTION INTRAVENOUS at 03:23

## 2023-11-07 RX ADMIN — INSULIN LISPRO 2 UNITS: 100 INJECTION, SOLUTION INTRAVENOUS; SUBCUTANEOUS at 08:32

## 2023-11-07 RX ADMIN — MAGNESIUM SULFATE HEPTAHYDRATE 2 G: 40 INJECTION, SOLUTION INTRAVENOUS at 15:37

## 2023-11-07 RX ADMIN — HEPARIN SODIUM 5000 UNITS: 5000 INJECTION, SOLUTION INTRAVENOUS; SUBCUTANEOUS at 21:32

## 2023-11-07 RX ADMIN — ATORVASTATIN CALCIUM 40 MG: 40 TABLET, FILM COATED ORAL at 09:01

## 2023-11-07 RX ADMIN — MONTELUKAST 10 MG: 10 TABLET, FILM COATED ORAL at 21:31

## 2023-11-08 LAB
ANION GAP SERPL CALC-SCNC: 11 MEQ/L (ref 3–15)
ANION GAP SERPL CALC-SCNC: 8 MEQ/L (ref 3–15)
BUN SERPL-MCNC: 24 MG/DL (ref 7–25)
BUN SERPL-MCNC: 24 MG/DL (ref 7–25)
CALCIUM SERPL-MCNC: 8 MG/DL (ref 8.6–10.3)
CALCIUM SERPL-MCNC: 8.3 MG/DL (ref 8.6–10.3)
CHLORIDE SERPL-SCNC: 105 MEQ/L (ref 98–107)
CHLORIDE SERPL-SCNC: 107 MEQ/L (ref 98–107)
CO2 SERPL-SCNC: 22 MEQ/L (ref 21–31)
CO2 SERPL-SCNC: 27 MEQ/L (ref 21–31)
CREAT SERPL-MCNC: 2.3 MG/DL (ref 0.7–1.3)
CREAT SERPL-MCNC: 2.4 MG/DL (ref 0.7–1.3)
ERYTHROCYTE [DISTWIDTH] IN BLOOD BY AUTOMATED COUNT: 13.6 % (ref 11.6–14.4)
GFR SERPL CREATININE-BSD FRML MDRD: 28.4 ML/MIN/1.73M*2
GFR SERPL CREATININE-BSD FRML MDRD: 29.8 ML/MIN/1.73M*2
GLUCOSE BLD-MCNC: 112 MG/DL (ref 70–99)
GLUCOSE BLD-MCNC: 165 MG/DL (ref 70–99)
GLUCOSE BLD-MCNC: 207 MG/DL (ref 70–99)
GLUCOSE BLD-MCNC: 93 MG/DL (ref 70–99)
GLUCOSE SERPL-MCNC: 204 MG/DL (ref 70–99)
GLUCOSE SERPL-MCNC: 258 MG/DL (ref 70–99)
HCT VFR BLDCO AUTO: 33.9 % (ref 40.1–51)
HGB BLD-MCNC: 11.3 G/DL (ref 13.7–17.5)
MAGNESIUM SERPL-MCNC: 1.7 MG/DL (ref 1.8–2.5)
MAGNESIUM SERPL-MCNC: 2.1 MG/DL (ref 1.8–2.5)
MCH RBC QN AUTO: 27.3 PG (ref 28–33.2)
MCHC RBC AUTO-ENTMCNC: 33.3 G/DL (ref 32.2–36.5)
MCV RBC AUTO: 81.9 FL (ref 83–98)
PDW BLD AUTO: 9.2 FL (ref 9.4–12.4)
PHOSPHATE SERPL-MCNC: 3 MG/DL (ref 2.4–4.7)
PLATELET # BLD AUTO: 252 K/UL (ref 150–350)
POCT TEST: ABNORMAL
POCT TEST: NORMAL
POTASSIUM SERPL-SCNC: 3.7 MEQ/L (ref 3.5–5.1)
POTASSIUM SERPL-SCNC: 4.1 MEQ/L (ref 3.5–5.1)
RBC # BLD AUTO: 4.14 M/UL (ref 4.5–5.8)
SODIUM SERPL-SCNC: 140 MEQ/L (ref 136–145)
SODIUM SERPL-SCNC: 140 MEQ/L (ref 136–145)
WBC # BLD AUTO: 11.84 K/UL (ref 3.8–10.5)

## 2023-11-08 PROCEDURE — 21400000 HC ROOM AND CARE CCU/INTERMEDIATE

## 2023-11-08 PROCEDURE — 63600000 HC DRUGS/DETAIL CODE: Mod: JZ | Performed by: STUDENT IN AN ORGANIZED HEALTH CARE EDUCATION/TRAINING PROGRAM

## 2023-11-08 PROCEDURE — 63600000 HC DRUGS/DETAIL CODE: Mod: JZ

## 2023-11-08 PROCEDURE — 63600000 HC DRUGS/DETAIL CODE

## 2023-11-08 PROCEDURE — 36415 COLL VENOUS BLD VENIPUNCTURE: CPT

## 2023-11-08 PROCEDURE — 80048 BASIC METABOLIC PNL TOTAL CA: CPT

## 2023-11-08 PROCEDURE — 83735 ASSAY OF MAGNESIUM: CPT

## 2023-11-08 PROCEDURE — 99233 SBSQ HOSP IP/OBS HIGH 50: CPT | Performed by: STUDENT IN AN ORGANIZED HEALTH CARE EDUCATION/TRAINING PROGRAM

## 2023-11-08 PROCEDURE — 85027 COMPLETE CBC AUTOMATED: CPT

## 2023-11-08 PROCEDURE — 63700000 HC SELF-ADMINISTRABLE DRUG

## 2023-11-08 PROCEDURE — 84100 ASSAY OF PHOSPHORUS: CPT

## 2023-11-08 RX ADMIN — INSULIN LISPRO 2 UNITS: 100 INJECTION, SOLUTION INTRAVENOUS; SUBCUTANEOUS at 07:36

## 2023-11-08 RX ADMIN — INSULIN LISPRO 2 UNITS: 100 INJECTION, SOLUTION INTRAVENOUS; SUBCUTANEOUS at 17:05

## 2023-11-08 RX ADMIN — HEPARIN SODIUM 5000 UNITS: 5000 INJECTION, SOLUTION INTRAVENOUS; SUBCUTANEOUS at 21:48

## 2023-11-08 RX ADMIN — HEPARIN SODIUM 5000 UNITS: 5000 INJECTION, SOLUTION INTRAVENOUS; SUBCUTANEOUS at 06:21

## 2023-11-08 RX ADMIN — SODIUM CHLORIDE, POTASSIUM CHLORIDE, SODIUM LACTATE AND CALCIUM CHLORIDE: 600; 310; 30; 20 INJECTION, SOLUTION INTRAVENOUS at 00:10

## 2023-11-08 RX ADMIN — INSULIN LISPRO 2 UNITS: 100 INJECTION, SOLUTION INTRAVENOUS; SUBCUTANEOUS at 12:12

## 2023-11-08 RX ADMIN — ATORVASTATIN CALCIUM 40 MG: 40 TABLET, FILM COATED ORAL at 07:36

## 2023-11-08 RX ADMIN — SODIUM CHLORIDE, POTASSIUM CHLORIDE, SODIUM LACTATE AND CALCIUM CHLORIDE: 600; 310; 30; 20 INJECTION, SOLUTION INTRAVENOUS at 19:01

## 2023-11-08 RX ADMIN — MAGNESIUM SULFATE HEPTAHYDRATE 2 G: 40 INJECTION, SOLUTION INTRAVENOUS at 12:08

## 2023-11-08 RX ADMIN — HEPARIN SODIUM 5000 UNITS: 5000 INJECTION, SOLUTION INTRAVENOUS; SUBCUTANEOUS at 14:05

## 2023-11-08 RX ADMIN — INSULIN LISPRO 3 UNITS: 100 INJECTION, SOLUTION INTRAVENOUS; SUBCUTANEOUS at 17:05

## 2023-11-08 RX ADMIN — MONTELUKAST 10 MG: 10 TABLET, FILM COATED ORAL at 21:48

## 2023-11-09 LAB
ANION GAP SERPL CALC-SCNC: 8 MEQ/L (ref 3–15)
ANION GAP SERPL CALC-SCNC: 8 MEQ/L (ref 3–15)
BUN SERPL-MCNC: 19 MG/DL (ref 7–25)
BUN SERPL-MCNC: 20 MG/DL (ref 7–25)
CALCIUM SERPL-MCNC: 8.1 MG/DL (ref 8.6–10.3)
CALCIUM SERPL-MCNC: 8.3 MG/DL (ref 8.6–10.3)
CHLORIDE SERPL-SCNC: 104 MEQ/L (ref 98–107)
CHLORIDE SERPL-SCNC: 107 MEQ/L (ref 98–107)
CO2 SERPL-SCNC: 27 MEQ/L (ref 21–31)
CO2 SERPL-SCNC: 30 MEQ/L (ref 21–31)
CREAT SERPL-MCNC: 2.2 MG/DL (ref 0.7–1.3)
CREAT SERPL-MCNC: 2.3 MG/DL (ref 0.7–1.3)
ERYTHROCYTE [DISTWIDTH] IN BLOOD BY AUTOMATED COUNT: 13.5 % (ref 11.6–14.4)
GFR SERPL CREATININE-BSD FRML MDRD: 29.8 ML/MIN/1.73M*2
GFR SERPL CREATININE-BSD FRML MDRD: 31.3 ML/MIN/1.73M*2
GLUCOSE BLD-MCNC: 106 MG/DL (ref 70–99)
GLUCOSE BLD-MCNC: 142 MG/DL (ref 70–99)
GLUCOSE BLD-MCNC: 185 MG/DL (ref 70–99)
GLUCOSE BLD-MCNC: 276 MG/DL (ref 70–99)
GLUCOSE SERPL-MCNC: 150 MG/DL (ref 70–99)
GLUCOSE SERPL-MCNC: 96 MG/DL (ref 70–99)
HCT VFR BLDCO AUTO: 33.4 % (ref 40.1–51)
HGB BLD-MCNC: 11.1 G/DL (ref 13.7–17.5)
MAGNESIUM SERPL-MCNC: 1.7 MG/DL (ref 1.8–2.5)
MAGNESIUM SERPL-MCNC: 1.7 MG/DL (ref 1.8–2.5)
MCH RBC QN AUTO: 27.4 PG (ref 28–33.2)
MCHC RBC AUTO-ENTMCNC: 33.2 G/DL (ref 32.2–36.5)
MCV RBC AUTO: 82.5 FL (ref 83–98)
PDW BLD AUTO: 9.5 FL (ref 9.4–12.4)
PHOSPHATE SERPL-MCNC: 3.8 MG/DL (ref 2.4–4.7)
PLATELET # BLD AUTO: 251 K/UL (ref 150–350)
POCT TEST: ABNORMAL
POTASSIUM SERPL-SCNC: 3.8 MEQ/L (ref 3.5–5.1)
POTASSIUM SERPL-SCNC: 3.8 MEQ/L (ref 3.5–5.1)
RBC # BLD AUTO: 4.05 M/UL (ref 4.5–5.8)
SODIUM SERPL-SCNC: 142 MEQ/L (ref 136–145)
SODIUM SERPL-SCNC: 142 MEQ/L (ref 136–145)
WBC # BLD AUTO: 11.03 K/UL (ref 3.8–10.5)

## 2023-11-09 PROCEDURE — 21400000 HC ROOM AND CARE CCU/INTERMEDIATE

## 2023-11-09 PROCEDURE — 36415 COLL VENOUS BLD VENIPUNCTURE: CPT

## 2023-11-09 PROCEDURE — 84100 ASSAY OF PHOSPHORUS: CPT

## 2023-11-09 PROCEDURE — 63700000 HC SELF-ADMINISTRABLE DRUG: Performed by: STUDENT IN AN ORGANIZED HEALTH CARE EDUCATION/TRAINING PROGRAM

## 2023-11-09 PROCEDURE — 99233 SBSQ HOSP IP/OBS HIGH 50: CPT | Performed by: STUDENT IN AN ORGANIZED HEALTH CARE EDUCATION/TRAINING PROGRAM

## 2023-11-09 PROCEDURE — 63700000 HC SELF-ADMINISTRABLE DRUG

## 2023-11-09 PROCEDURE — 63600000 HC DRUGS/DETAIL CODE

## 2023-11-09 PROCEDURE — 85027 COMPLETE CBC AUTOMATED: CPT

## 2023-11-09 PROCEDURE — 63600000 HC DRUGS/DETAIL CODE: Mod: JZ

## 2023-11-09 PROCEDURE — 83735 ASSAY OF MAGNESIUM: CPT

## 2023-11-09 PROCEDURE — 80048 BASIC METABOLIC PNL TOTAL CA: CPT

## 2023-11-09 RX ORDER — LANOLIN ALCOHOL/MO/W.PET/CERES
400 CREAM (GRAM) TOPICAL ONCE
Status: COMPLETED | OUTPATIENT
Start: 2023-11-09 | End: 2023-11-09

## 2023-11-09 RX ORDER — AMLODIPINE BESYLATE 5 MG/1
5 TABLET ORAL DAILY
Status: DISCONTINUED | OUTPATIENT
Start: 2023-11-09 | End: 2023-11-12 | Stop reason: HOSPADM

## 2023-11-09 RX ADMIN — INSULIN LISPRO 2 UNITS: 100 INJECTION, SOLUTION INTRAVENOUS; SUBCUTANEOUS at 11:43

## 2023-11-09 RX ADMIN — SODIUM CHLORIDE, POTASSIUM CHLORIDE, SODIUM LACTATE AND CALCIUM CHLORIDE: 600; 310; 30; 20 INJECTION, SOLUTION INTRAVENOUS at 05:00

## 2023-11-09 RX ADMIN — MAGNESIUM OXIDE TAB 400 MG (241.3 MG ELEMENTAL MG) 400 MG: 400 (241.3 MG) TAB at 21:09

## 2023-11-09 RX ADMIN — HEPARIN SODIUM 5000 UNITS: 5000 INJECTION, SOLUTION INTRAVENOUS; SUBCUTANEOUS at 14:29

## 2023-11-09 RX ADMIN — INSULIN LISPRO 2 UNITS: 100 INJECTION, SOLUTION INTRAVENOUS; SUBCUTANEOUS at 16:49

## 2023-11-09 RX ADMIN — SODIUM CHLORIDE, POTASSIUM CHLORIDE, SODIUM LACTATE AND CALCIUM CHLORIDE: 600; 310; 30; 20 INJECTION, SOLUTION INTRAVENOUS at 13:30

## 2023-11-09 RX ADMIN — HEPARIN SODIUM 5000 UNITS: 5000 INJECTION, SOLUTION INTRAVENOUS; SUBCUTANEOUS at 06:13

## 2023-11-09 RX ADMIN — HEPARIN SODIUM 5000 UNITS: 5000 INJECTION, SOLUTION INTRAVENOUS; SUBCUTANEOUS at 21:09

## 2023-11-09 RX ADMIN — MONTELUKAST 10 MG: 10 TABLET, FILM COATED ORAL at 21:09

## 2023-11-09 RX ADMIN — INSULIN LISPRO 3 UNITS: 100 INJECTION, SOLUTION INTRAVENOUS; SUBCUTANEOUS at 21:14

## 2023-11-09 RX ADMIN — INSULIN LISPRO 3 UNITS: 100 INJECTION, SOLUTION INTRAVENOUS; SUBCUTANEOUS at 08:56

## 2023-11-09 RX ADMIN — AMLODIPINE BESYLATE 5 MG: 5 TABLET ORAL at 11:43

## 2023-11-09 RX ADMIN — INSULIN LISPRO 2 UNITS: 100 INJECTION, SOLUTION INTRAVENOUS; SUBCUTANEOUS at 08:55

## 2023-11-09 RX ADMIN — ATORVASTATIN CALCIUM 40 MG: 40 TABLET, FILM COATED ORAL at 08:56

## 2023-11-09 RX ADMIN — SODIUM CHLORIDE, POTASSIUM CHLORIDE, SODIUM LACTATE AND CALCIUM CHLORIDE: 600; 310; 30; 20 INJECTION, SOLUTION INTRAVENOUS at 21:08

## 2023-11-10 LAB
ANION GAP SERPL CALC-SCNC: 10 MEQ/L (ref 3–15)
ANION GAP SERPL CALC-SCNC: 11 MEQ/L (ref 3–15)
BUN SERPL-MCNC: 15 MG/DL (ref 7–25)
BUN SERPL-MCNC: 16 MG/DL (ref 7–25)
CALCIUM SERPL-MCNC: 7.8 MG/DL (ref 8.6–10.3)
CALCIUM SERPL-MCNC: 8.3 MG/DL (ref 8.6–10.3)
CHLORIDE SERPL-SCNC: 103 MEQ/L (ref 98–107)
CHLORIDE SERPL-SCNC: 106 MEQ/L (ref 98–107)
CO2 SERPL-SCNC: 25 MEQ/L (ref 21–31)
CO2 SERPL-SCNC: 29 MEQ/L (ref 21–31)
CREAT SERPL-MCNC: 2 MG/DL (ref 0.7–1.3)
CREAT SERPL-MCNC: 2.1 MG/DL (ref 0.7–1.3)
ERYTHROCYTE [DISTWIDTH] IN BLOOD BY AUTOMATED COUNT: 13.5 % (ref 11.6–14.4)
GFR SERPL CREATININE-BSD FRML MDRD: 33.1 ML/MIN/1.73M*2
GFR SERPL CREATININE-BSD FRML MDRD: 35 ML/MIN/1.73M*2
GLUCOSE BLD-MCNC: 135 MG/DL (ref 70–99)
GLUCOSE BLD-MCNC: 171 MG/DL (ref 70–99)
GLUCOSE BLD-MCNC: 174 MG/DL (ref 70–99)
GLUCOSE BLD-MCNC: 98 MG/DL (ref 70–99)
GLUCOSE SERPL-MCNC: 153 MG/DL (ref 70–99)
GLUCOSE SERPL-MCNC: 90 MG/DL (ref 70–99)
HCT VFR BLDCO AUTO: 33.2 % (ref 40.1–51)
HGB BLD-MCNC: 10.8 G/DL (ref 13.7–17.5)
MAGNESIUM SERPL-MCNC: 1.4 MG/DL (ref 1.8–2.5)
MAGNESIUM SERPL-MCNC: 1.9 MG/DL (ref 1.8–2.5)
MCH RBC QN AUTO: 27.1 PG (ref 28–33.2)
MCHC RBC AUTO-ENTMCNC: 32.5 G/DL (ref 32.2–36.5)
MCV RBC AUTO: 83.2 FL (ref 83–98)
PDW BLD AUTO: 9.6 FL (ref 9.4–12.4)
PHOSPHATE SERPL-MCNC: 4.1 MG/DL (ref 2.4–4.7)
PLATELET # BLD AUTO: 245 K/UL (ref 150–350)
POCT TEST: ABNORMAL
POCT TEST: NORMAL
POTASSIUM SERPL-SCNC: 3.9 MEQ/L (ref 3.5–5.1)
POTASSIUM SERPL-SCNC: 4 MEQ/L (ref 3.5–5.1)
RBC # BLD AUTO: 3.99 M/UL (ref 4.5–5.8)
SODIUM SERPL-SCNC: 142 MEQ/L (ref 136–145)
SODIUM SERPL-SCNC: 142 MEQ/L (ref 136–145)
WBC # BLD AUTO: 12.18 K/UL (ref 3.8–10.5)

## 2023-11-10 PROCEDURE — 80048 BASIC METABOLIC PNL TOTAL CA: CPT

## 2023-11-10 PROCEDURE — 36415 COLL VENOUS BLD VENIPUNCTURE: CPT

## 2023-11-10 PROCEDURE — 63700000 HC SELF-ADMINISTRABLE DRUG

## 2023-11-10 PROCEDURE — 63700000 HC SELF-ADMINISTRABLE DRUG: Performed by: STUDENT IN AN ORGANIZED HEALTH CARE EDUCATION/TRAINING PROGRAM

## 2023-11-10 PROCEDURE — 21400000 HC ROOM AND CARE CCU/INTERMEDIATE

## 2023-11-10 PROCEDURE — 99233 SBSQ HOSP IP/OBS HIGH 50: CPT | Performed by: STUDENT IN AN ORGANIZED HEALTH CARE EDUCATION/TRAINING PROGRAM

## 2023-11-10 PROCEDURE — 83735 ASSAY OF MAGNESIUM: CPT

## 2023-11-10 PROCEDURE — 63600000 HC DRUGS/DETAIL CODE

## 2023-11-10 PROCEDURE — 63600000 HC DRUGS/DETAIL CODE: Mod: JZ

## 2023-11-10 PROCEDURE — 84100 ASSAY OF PHOSPHORUS: CPT

## 2023-11-10 PROCEDURE — 85027 COMPLETE CBC AUTOMATED: CPT

## 2023-11-10 RX ORDER — LANOLIN ALCOHOL/MO/W.PET/CERES
400 CREAM (GRAM) TOPICAL ONCE
Status: COMPLETED | OUTPATIENT
Start: 2023-11-10 | End: 2023-11-10

## 2023-11-10 RX ADMIN — AMLODIPINE BESYLATE 5 MG: 5 TABLET ORAL at 08:04

## 2023-11-10 RX ADMIN — HEPARIN SODIUM 5000 UNITS: 5000 INJECTION, SOLUTION INTRAVENOUS; SUBCUTANEOUS at 21:05

## 2023-11-10 RX ADMIN — SODIUM CHLORIDE, POTASSIUM CHLORIDE, SODIUM LACTATE AND CALCIUM CHLORIDE: 600; 310; 30; 20 INJECTION, SOLUTION INTRAVENOUS at 05:47

## 2023-11-10 RX ADMIN — SODIUM CHLORIDE, POTASSIUM CHLORIDE, SODIUM LACTATE AND CALCIUM CHLORIDE: 600; 310; 30; 20 INJECTION, SOLUTION INTRAVENOUS at 21:09

## 2023-11-10 RX ADMIN — INSULIN LISPRO 2 UNITS: 100 INJECTION, SOLUTION INTRAVENOUS; SUBCUTANEOUS at 13:01

## 2023-11-10 RX ADMIN — ATORVASTATIN CALCIUM 40 MG: 40 TABLET, FILM COATED ORAL at 08:03

## 2023-11-10 RX ADMIN — INSULIN LISPRO 2 UNITS: 100 INJECTION, SOLUTION INTRAVENOUS; SUBCUTANEOUS at 08:03

## 2023-11-10 RX ADMIN — MAGNESIUM OXIDE TAB 400 MG (241.3 MG ELEMENTAL MG) 400 MG: 400 (241.3 MG) TAB at 10:07

## 2023-11-10 RX ADMIN — INSULIN LISPRO 2 UNITS: 100 INJECTION, SOLUTION INTRAVENOUS; SUBCUTANEOUS at 17:00

## 2023-11-10 RX ADMIN — MONTELUKAST 10 MG: 10 TABLET, FILM COATED ORAL at 21:05

## 2023-11-10 RX ADMIN — MAGNESIUM SULFATE HEPTAHYDRATE 2 G: 40 INJECTION, SOLUTION INTRAVENOUS at 10:07

## 2023-11-10 RX ADMIN — SODIUM CHLORIDE, POTASSIUM CHLORIDE, SODIUM LACTATE AND CALCIUM CHLORIDE: 600; 310; 30; 20 INJECTION, SOLUTION INTRAVENOUS at 14:24

## 2023-11-10 RX ADMIN — HEPARIN SODIUM 5000 UNITS: 5000 INJECTION, SOLUTION INTRAVENOUS; SUBCUTANEOUS at 05:35

## 2023-11-10 RX ADMIN — HEPARIN SODIUM 5000 UNITS: 5000 INJECTION, SOLUTION INTRAVENOUS; SUBCUTANEOUS at 13:07

## 2023-11-11 LAB
ANION GAP SERPL CALC-SCNC: 7 MEQ/L (ref 3–15)
ANION GAP SERPL CALC-SCNC: 8 MEQ/L (ref 3–15)
BUN SERPL-MCNC: 14 MG/DL (ref 7–25)
BUN SERPL-MCNC: 15 MG/DL (ref 7–25)
CALCIUM SERPL-MCNC: 7.9 MG/DL (ref 8.6–10.3)
CALCIUM SERPL-MCNC: 8 MG/DL (ref 8.6–10.3)
CHLORIDE SERPL-SCNC: 105 MEQ/L (ref 98–107)
CHLORIDE SERPL-SCNC: 105 MEQ/L (ref 98–107)
CO2 SERPL-SCNC: 28 MEQ/L (ref 21–31)
CO2 SERPL-SCNC: 29 MEQ/L (ref 21–31)
CREAT SERPL-MCNC: 1.8 MG/DL (ref 0.7–1.3)
CREAT SERPL-MCNC: 2 MG/DL (ref 0.7–1.3)
ERYTHROCYTE [DISTWIDTH] IN BLOOD BY AUTOMATED COUNT: 13.5 % (ref 11.6–14.4)
GFR SERPL CREATININE-BSD FRML MDRD: 35 ML/MIN/1.73M*2
GFR SERPL CREATININE-BSD FRML MDRD: 39.5 ML/MIN/1.73M*2
GLUCOSE BLD-MCNC: 114 MG/DL (ref 70–99)
GLUCOSE BLD-MCNC: 119 MG/DL (ref 70–99)
GLUCOSE BLD-MCNC: 190 MG/DL (ref 70–99)
GLUCOSE BLD-MCNC: 197 MG/DL (ref 70–99)
GLUCOSE SERPL-MCNC: 106 MG/DL (ref 70–99)
GLUCOSE SERPL-MCNC: 193 MG/DL (ref 70–99)
HCT VFR BLDCO AUTO: 34.9 % (ref 40.1–51)
HGB BLD-MCNC: 11.3 G/DL (ref 13.7–17.5)
MAGNESIUM SERPL-MCNC: 1.7 MG/DL (ref 1.8–2.5)
MAGNESIUM SERPL-MCNC: 2 MG/DL (ref 1.8–2.5)
MCH RBC QN AUTO: 27.2 PG (ref 28–33.2)
MCHC RBC AUTO-ENTMCNC: 32.4 G/DL (ref 32.2–36.5)
MCV RBC AUTO: 83.9 FL (ref 83–98)
PDW BLD AUTO: 9.6 FL (ref 9.4–12.4)
PHOSPHATE SERPL-MCNC: 3.8 MG/DL (ref 2.4–4.7)
PLATELET # BLD AUTO: 255 K/UL (ref 150–350)
POCT TEST: ABNORMAL
POTASSIUM SERPL-SCNC: 3.8 MEQ/L (ref 3.5–5.1)
POTASSIUM SERPL-SCNC: 4 MEQ/L (ref 3.5–5.1)
RBC # BLD AUTO: 4.16 M/UL (ref 4.5–5.8)
SODIUM SERPL-SCNC: 141 MEQ/L (ref 136–145)
SODIUM SERPL-SCNC: 141 MEQ/L (ref 136–145)
WBC # BLD AUTO: 10.51 K/UL (ref 3.8–10.5)

## 2023-11-11 PROCEDURE — 63600000 HC DRUGS/DETAIL CODE

## 2023-11-11 PROCEDURE — 36415 COLL VENOUS BLD VENIPUNCTURE: CPT

## 2023-11-11 PROCEDURE — 63600000 HC DRUGS/DETAIL CODE: Mod: JZ

## 2023-11-11 PROCEDURE — 63700000 HC SELF-ADMINISTRABLE DRUG

## 2023-11-11 PROCEDURE — 83735 ASSAY OF MAGNESIUM: CPT

## 2023-11-11 PROCEDURE — 85027 COMPLETE CBC AUTOMATED: CPT

## 2023-11-11 PROCEDURE — 25000000 HC PHARMACY GENERAL: Performed by: HOSPITALIST

## 2023-11-11 PROCEDURE — 80048 BASIC METABOLIC PNL TOTAL CA: CPT

## 2023-11-11 PROCEDURE — 84100 ASSAY OF PHOSPHORUS: CPT

## 2023-11-11 PROCEDURE — 99233 SBSQ HOSP IP/OBS HIGH 50: CPT | Performed by: STUDENT IN AN ORGANIZED HEALTH CARE EDUCATION/TRAINING PROGRAM

## 2023-11-11 PROCEDURE — 63700000 HC SELF-ADMINISTRABLE DRUG: Performed by: STUDENT IN AN ORGANIZED HEALTH CARE EDUCATION/TRAINING PROGRAM

## 2023-11-11 PROCEDURE — 21400000 HC ROOM AND CARE CCU/INTERMEDIATE

## 2023-11-11 RX ORDER — LANOLIN ALCOHOL/MO/W.PET/CERES
200 CREAM (GRAM) TOPICAL ONCE
Status: COMPLETED | OUTPATIENT
Start: 2023-11-11 | End: 2023-11-11

## 2023-11-11 RX ADMIN — INSULIN LISPRO 2 UNITS: 100 INJECTION, SOLUTION INTRAVENOUS; SUBCUTANEOUS at 07:35

## 2023-11-11 RX ADMIN — INSULIN LISPRO 3 UNITS: 100 INJECTION, SOLUTION INTRAVENOUS; SUBCUTANEOUS at 21:41

## 2023-11-11 RX ADMIN — HEPARIN SODIUM 5000 UNITS: 5000 INJECTION, SOLUTION INTRAVENOUS; SUBCUTANEOUS at 13:19

## 2023-11-11 RX ADMIN — HEPARIN SODIUM 5000 UNITS: 5000 INJECTION, SOLUTION INTRAVENOUS; SUBCUTANEOUS at 21:41

## 2023-11-11 RX ADMIN — INSULIN LISPRO 3 UNITS: 100 INJECTION, SOLUTION INTRAVENOUS; SUBCUTANEOUS at 13:20

## 2023-11-11 RX ADMIN — ATORVASTATIN CALCIUM 40 MG: 40 TABLET, FILM COATED ORAL at 07:38

## 2023-11-11 RX ADMIN — MONTELUKAST 10 MG: 10 TABLET, FILM COATED ORAL at 21:41

## 2023-11-11 RX ADMIN — INSULIN LISPRO 2 UNITS: 100 INJECTION, SOLUTION INTRAVENOUS; SUBCUTANEOUS at 13:20

## 2023-11-11 RX ADMIN — INSULIN LISPRO 2 UNITS: 100 INJECTION, SOLUTION INTRAVENOUS; SUBCUTANEOUS at 17:05

## 2023-11-11 RX ADMIN — HEPARIN SODIUM 5000 UNITS: 5000 INJECTION, SOLUTION INTRAVENOUS; SUBCUTANEOUS at 05:08

## 2023-11-11 RX ADMIN — SODIUM CHLORIDE, POTASSIUM CHLORIDE, SODIUM LACTATE AND CALCIUM CHLORIDE: 600; 310; 30; 20 INJECTION, SOLUTION INTRAVENOUS at 17:01

## 2023-11-11 RX ADMIN — MAGNESIUM OXIDE TAB 400 MG (241.3 MG ELEMENTAL MG) 200 MG: 400 (241.3 MG) TAB at 05:08

## 2023-11-11 RX ADMIN — ALBUTEROL SULFATE 2.5 MG: 2.5 SOLUTION RESPIRATORY (INHALATION) at 18:00

## 2023-11-11 RX ADMIN — MAGNESIUM SULFATE HEPTAHYDRATE 2 G: 40 INJECTION, SOLUTION INTRAVENOUS at 09:49

## 2023-11-11 RX ADMIN — SODIUM CHLORIDE, POTASSIUM CHLORIDE, SODIUM LACTATE AND CALCIUM CHLORIDE: 600; 310; 30; 20 INJECTION, SOLUTION INTRAVENOUS at 05:47

## 2023-11-11 RX ADMIN — AMLODIPINE BESYLATE 5 MG: 5 TABLET ORAL at 07:38

## 2023-11-12 VITALS
DIASTOLIC BLOOD PRESSURE: 79 MMHG | OXYGEN SATURATION: 93 % | HEART RATE: 84 BPM | BODY MASS INDEX: 30.31 KG/M2 | WEIGHT: 193.1 LBS | HEIGHT: 67 IN | SYSTOLIC BLOOD PRESSURE: 144 MMHG | RESPIRATION RATE: 18 BRPM | TEMPERATURE: 98.3 F

## 2023-11-12 LAB
ANION GAP SERPL CALC-SCNC: 10 MEQ/L (ref 3–15)
BUN SERPL-MCNC: 12 MG/DL (ref 7–25)
CALCIUM SERPL-MCNC: 8.1 MG/DL (ref 8.6–10.3)
CHLORIDE SERPL-SCNC: 104 MEQ/L (ref 98–107)
CO2 SERPL-SCNC: 28 MEQ/L (ref 21–31)
CREAT SERPL-MCNC: 2 MG/DL (ref 0.7–1.3)
ERYTHROCYTE [DISTWIDTH] IN BLOOD BY AUTOMATED COUNT: 13.5 % (ref 11.6–14.4)
GFR SERPL CREATININE-BSD FRML MDRD: 35 ML/MIN/1.73M*2
GLUCOSE BLD-MCNC: 107 MG/DL (ref 70–99)
GLUCOSE BLD-MCNC: 191 MG/DL (ref 70–99)
GLUCOSE SERPL-MCNC: 95 MG/DL (ref 70–99)
HCT VFR BLDCO AUTO: 34.7 % (ref 40.1–51)
HGB BLD-MCNC: 11.2 G/DL (ref 13.7–17.5)
MAGNESIUM SERPL-MCNC: 1.8 MG/DL (ref 1.8–2.5)
MCH RBC QN AUTO: 27.1 PG (ref 28–33.2)
MCHC RBC AUTO-ENTMCNC: 32.3 G/DL (ref 32.2–36.5)
MCV RBC AUTO: 84 FL (ref 83–98)
PDW BLD AUTO: 9.5 FL (ref 9.4–12.4)
PHOSPHATE SERPL-MCNC: 4.2 MG/DL (ref 2.4–4.7)
PLATELET # BLD AUTO: 234 K/UL (ref 150–350)
POCT TEST: ABNORMAL
POCT TEST: ABNORMAL
POTASSIUM SERPL-SCNC: 3.9 MEQ/L (ref 3.5–5.1)
RBC # BLD AUTO: 4.13 M/UL (ref 4.5–5.8)
SODIUM SERPL-SCNC: 142 MEQ/L (ref 136–145)
WBC # BLD AUTO: 10.42 K/UL (ref 3.8–10.5)

## 2023-11-12 PROCEDURE — 83735 ASSAY OF MAGNESIUM: CPT

## 2023-11-12 PROCEDURE — 36415 COLL VENOUS BLD VENIPUNCTURE: CPT

## 2023-11-12 PROCEDURE — 99238 HOSP IP/OBS DSCHRG MGMT 30/<: CPT | Performed by: STUDENT IN AN ORGANIZED HEALTH CARE EDUCATION/TRAINING PROGRAM

## 2023-11-12 PROCEDURE — 80048 BASIC METABOLIC PNL TOTAL CA: CPT

## 2023-11-12 PROCEDURE — 84100 ASSAY OF PHOSPHORUS: CPT

## 2023-11-12 PROCEDURE — 63600000 HC DRUGS/DETAIL CODE

## 2023-11-12 PROCEDURE — 25000000 HC PHARMACY GENERAL: Performed by: HOSPITALIST

## 2023-11-12 PROCEDURE — 63700000 HC SELF-ADMINISTRABLE DRUG: Performed by: STUDENT IN AN ORGANIZED HEALTH CARE EDUCATION/TRAINING PROGRAM

## 2023-11-12 PROCEDURE — 63700000 HC SELF-ADMINISTRABLE DRUG

## 2023-11-12 PROCEDURE — 85027 COMPLETE CBC AUTOMATED: CPT

## 2023-11-12 PROCEDURE — 99999 PR OFFICE/OUTPT VISIT,PROCEDURE ONLY: CPT | Performed by: STUDENT IN AN ORGANIZED HEALTH CARE EDUCATION/TRAINING PROGRAM

## 2023-11-12 PROCEDURE — 82310 ASSAY OF CALCIUM: CPT

## 2023-11-12 PROCEDURE — 63600000 HC DRUGS/DETAIL CODE: Mod: JZ

## 2023-11-12 RX ORDER — AMLODIPINE BESYLATE 5 MG/1
5 TABLET ORAL DAILY
Qty: 30 TABLET | Refills: 0 | Status: SHIPPED | OUTPATIENT
Start: 2023-11-12 | End: 2023-12-12

## 2023-11-12 RX ADMIN — HEPARIN SODIUM 5000 UNITS: 5000 INJECTION, SOLUTION INTRAVENOUS; SUBCUTANEOUS at 07:14

## 2023-11-12 RX ADMIN — INSULIN LISPRO 2 UNITS: 100 INJECTION, SOLUTION INTRAVENOUS; SUBCUTANEOUS at 12:27

## 2023-11-12 RX ADMIN — INSULIN LISPRO 3 UNITS: 100 INJECTION, SOLUTION INTRAVENOUS; SUBCUTANEOUS at 12:26

## 2023-11-12 RX ADMIN — MAGNESIUM SULFATE HEPTAHYDRATE 2 G: 40 INJECTION, SOLUTION INTRAVENOUS at 08:25

## 2023-11-12 RX ADMIN — SODIUM CHLORIDE, POTASSIUM CHLORIDE, SODIUM LACTATE AND CALCIUM CHLORIDE: 600; 310; 30; 20 INJECTION, SOLUTION INTRAVENOUS at 10:57

## 2023-11-12 RX ADMIN — SODIUM CHLORIDE, POTASSIUM CHLORIDE, SODIUM LACTATE AND CALCIUM CHLORIDE: 600; 310; 30; 20 INJECTION, SOLUTION INTRAVENOUS at 01:39

## 2023-11-12 RX ADMIN — INSULIN LISPRO 2 UNITS: 100 INJECTION, SOLUTION INTRAVENOUS; SUBCUTANEOUS at 08:28

## 2023-11-12 RX ADMIN — ATORVASTATIN CALCIUM 40 MG: 40 TABLET, FILM COATED ORAL at 08:23

## 2023-11-12 RX ADMIN — ALBUTEROL SULFATE 2.5 MG: 2.5 SOLUTION RESPIRATORY (INHALATION) at 12:56

## 2023-11-12 RX ADMIN — AMLODIPINE BESYLATE 5 MG: 5 TABLET ORAL at 08:23

## 2023-11-20 ENCOUNTER — APPOINTMENT (RX ONLY)
Dept: URBAN - METROPOLITAN AREA CLINIC 28 | Facility: CLINIC | Age: 53
Setting detail: DERMATOLOGY
End: 2023-11-20

## 2023-11-20 ENCOUNTER — APPOINTMENT (OUTPATIENT)
Dept: LAB | Facility: HOSPITAL | Age: 53
End: 2023-11-20
Attending: STUDENT IN AN ORGANIZED HEALTH CARE EDUCATION/TRAINING PROGRAM
Payer: COMMERCIAL

## 2023-11-20 DIAGNOSIS — N17.9 AKI (ACUTE KIDNEY INJURY) (CMS/HCC): ICD-10-CM

## 2023-11-20 DIAGNOSIS — L71.8 OTHER ROSACEA: ICD-10-CM | Status: INADEQUATELY CONTROLLED

## 2023-11-20 DIAGNOSIS — L57.0 ACTINIC KERATOSIS: ICD-10-CM

## 2023-11-20 LAB
ANION GAP SERPL CALC-SCNC: 11 MEQ/L (ref 3–15)
BUN SERPL-MCNC: 23 MG/DL (ref 7–25)
CALCIUM SERPL-MCNC: 8.9 MG/DL (ref 8.6–10.3)
CHLORIDE SERPL-SCNC: 103 MEQ/L (ref 98–107)
CO2 SERPL-SCNC: 25 MEQ/L (ref 21–31)
CREAT SERPL-MCNC: 2 MG/DL (ref 0.7–1.3)
GFR SERPL CREATININE-BSD FRML MDRD: 35 ML/MIN/1.73M*2
GLUCOSE SERPL-MCNC: 132 MG/DL (ref 70–99)
POTASSIUM SERPL-SCNC: 4.3 MEQ/L (ref 3.5–5.1)
SODIUM SERPL-SCNC: 139 MEQ/L (ref 136–145)

## 2023-11-20 PROCEDURE — ? PRESCRIPTION

## 2023-11-20 PROCEDURE — 17000 DESTRUCT PREMALG LESION: CPT

## 2023-11-20 PROCEDURE — ? COUNSELING

## 2023-11-20 PROCEDURE — 80048 BASIC METABOLIC PNL TOTAL CA: CPT

## 2023-11-20 PROCEDURE — ? MEDICATION COUNSELING

## 2023-11-20 PROCEDURE — ? PRESCRIPTION MEDICATION MANAGEMENT

## 2023-11-20 PROCEDURE — 17003 DESTRUCT PREMALG LES 2-14: CPT

## 2023-11-20 PROCEDURE — 36415 COLL VENOUS BLD VENIPUNCTURE: CPT

## 2023-11-20 PROCEDURE — 99204 OFFICE O/P NEW MOD 45 MIN: CPT | Mod: 25

## 2023-11-20 PROCEDURE — ? PREMALIGNANT DESTRUCTION

## 2023-11-20 RX ORDER — SODIUM SULFACETAMIDE 100 MG/G
1 LIQUID TOPICAL QDAY
Qty: 480 | Refills: 3 | Status: ERX | COMMUNITY
Start: 2023-11-20

## 2023-11-20 RX ORDER — METRONIDAZOLE 7.5 MG/G
CREAM TOPICAL
Qty: 45 | Refills: 3 | Status: ERX | COMMUNITY
Start: 2023-11-20

## 2023-11-20 RX ADMIN — SODIUM SULFACETAMIDE 1: 100 LIQUID TOPICAL at 00:00

## 2023-11-20 RX ADMIN — METRONIDAZOLE: 7.5 CREAM TOPICAL at 00:00

## 2023-11-20 ASSESSMENT — LOCATION DETAILED DESCRIPTION DERM
LOCATION DETAILED: RIGHT CENTRAL MALAR CHEEK
LOCATION DETAILED: GLABELLA
LOCATION DETAILED: NASAL DORSUM
LOCATION DETAILED: LEFT CENTRAL MALAR CHEEK

## 2023-11-20 ASSESSMENT — LOCATION SIMPLE DESCRIPTION DERM
LOCATION SIMPLE: GLABELLA
LOCATION SIMPLE: LEFT CHEEK
LOCATION SIMPLE: RIGHT CHEEK
LOCATION SIMPLE: NOSE

## 2023-11-20 ASSESSMENT — LOCATION ZONE DERM
LOCATION ZONE: NOSE
LOCATION ZONE: FACE

## 2023-11-20 NOTE — PROCEDURE: MEDICATION COUNSELING
Cyclosporine Counseling:  I discussed with the patient the risks of cyclosporine including but not limited to hypertension, gingival hyperplasia,myelosuppression, immunosuppression, liver damage, kidney damage, neurotoxicity, lymphoma, and serious infections. The patient understands that monitoring is required including baseline blood pressure, CBC, CMP, lipid panel and uric acid, and then 1-2 times monthly CMP and blood pressure.
Itraconazole Pregnancy And Lactation Text: This medication is Pregnancy Category C and it isn't know if it is safe during pregnancy. It is also excreted in breast milk.
Olumiant Pregnancy And Lactation Text: Based on animal studies, Olumiant may cause embryo-fetal harm when administered to pregnant women.  The medication should not be used in pregnancy.  Breastfeeding is not recommended during treatment.
Libtayo Pregnancy And Lactation Text: This medication is contraindicated in pregnancy and when breast feeding.
Enbrel Counseling:  I discussed with the patient the risks of etanercept including but not limited to myelosuppression, immunosuppression, autoimmune hepatitis, demyelinating diseases, lymphoma, and infections.  The patient understands that monitoring is required including a PPD at baseline and must alert us or the primary physician if symptoms of infection or other concerning signs are noted.
Tazorac Counseling:  Patient advised that medication is irritating and drying.  Patient may need to apply sparingly and wash off after an hour before eventually leaving it on overnight.  The patient verbalized understanding of the proper use and possible adverse effects of tazorac.  All of the patient's questions and concerns were addressed.
Sarecycline Counseling: Patient advised regarding possible photosensitivity and discoloration of the teeth, skin, lips, tongue and gums.  Patient instructed to avoid sunlight, if possible.  When exposed to sunlight, patients should wear protective clothing, sunglasses, and sunscreen.  The patient was instructed to call the office immediately if the following severe adverse effects occur:  hearing changes, easy bruising/bleeding, severe headache, or vision changes.  The patient verbalized understanding of the proper use and possible adverse effects of sarecycline.  All of the patient's questions and concerns were addressed.
Erythromycin Pregnancy And Lactation Text: This medication is Pregnancy Category B and is considered safe during pregnancy. It is also excreted in breast milk.
Bactrim Counseling:  I discussed with the patient the risks of sulfa antibiotics including but not limited to GI upset, allergic reaction, drug rash, diarrhea, dizziness, photosensitivity, and yeast infections.  Rarely, more serious reactions can occur including but not limited to aplastic anemia, agranulocytosis, methemoglobinemia, blood dyscrasias, liver or kidney failure, lung infiltrates or desquamative/blistering drug rashes.
Rituxan Pregnancy And Lactation Text: This medication is Pregnancy Category C and it isn't know if it is safe during pregnancy. It is unknown if this medication is excreted in breast milk but similar antibodies are known to be excreted.
Cimetidine Pregnancy And Lactation Text: This medication is Pregnancy Category B and is considered safe during pregnancy. It is also excreted in breast milk and breast feeding isn't recommended.
Thalidomide Counseling: I discussed with the patient the risks of thalidomide including but not limited to birth defects, anxiety, weakness, chest pain, dizziness, cough and severe allergy.
Imiquimod Counseling:  I discussed with the patient the risks of imiquimod including but not limited to erythema, scaling, itching, weeping, crusting, and pain.  Patient understands that the inflammatory response to imiquimod is variable from person to person and was educated regarded proper titration schedule.  If flu-like symptoms develop, patient knows to discontinue the medication and contact us.
Zoryve Pregnancy And Lactation Text: It is unknown if this medication can cause problems during pregnancy and breastfeeding.
5-Fu Pregnancy And Lactation Text: This medication is Pregnancy Category X and contraindicated in pregnancy and in women who may become pregnant. It is unknown if this medication is excreted in breast milk.
Taltz Pregnancy And Lactation Text: The risk during pregnancy and breastfeeding is uncertain with this medication.
Topical Sulfur Applications Counseling: Topical Sulfur Counseling: Patient counseled that this medication may cause skin irritation or allergic reactions.  In the event of skin irritation, the patient was advised to reduce the amount of the drug applied or use it less frequently.   The patient verbalized understanding of the proper use and possible adverse effects of topical sulfur application.  All of the patient's questions and concerns were addressed.
Siliq Counseling:  I discussed with the patient the risks of Siliq including but not limited to new or worsening depression, suicidal thoughts and behavior, immunosuppression, malignancy, posterior leukoencephalopathy syndrome, and serious infections.  The patient understands that monitoring is required including a PPD at baseline and must alert us or the primary physician if symptoms of infection or other concerning signs are noted. There is also a special program designed to monitor depression which is required with Siliq.
Tazorac Pregnancy And Lactation Text: This medication is not safe during pregnancy. It is unknown if this medication is excreted in breast milk.
Bexarotene Counseling:  I discussed with the patient the risks of bexarotene including but not limited to hair loss, dry lips/skin/eyes, liver abnormalities, hyperlipidemia, pancreatitis, depression/suicidal ideation, photosensitivity, drug rash/allergic reactions, hypothyroidism, anemia, leukopenia, infection, cataracts, and teratogenicity.  Patient understands that they will need regular blood tests to check lipid profile, liver function tests, white blood cell count, thyroid function tests and pregnancy test if applicable.
Spironolactone Counseling: Patient advised regarding risks of diarrhea, abdominal pain, hyperkalemia, birth defects (for female patients), liver toxicity and renal toxicity. The patient may need blood work to monitor liver and kidney function and potassium levels while on therapy. The patient verbalized understanding of the proper use and possible adverse effects of spironolactone.  All of the patient's questions and concerns were addressed.
Methotrexate Pregnancy And Lactation Text: This medication is Pregnancy Category X and is known to cause fetal harm. This medication is excreted in breast milk.
Sarecycline Pregnancy And Lactation Text: This medication is Pregnancy Category D and not consider safe during pregnancy. It is also excreted in breast milk.
SSKI Counseling:  I discussed with the patient the risks of SSKI including but not limited to thyroid abnormalities, metallic taste, GI upset, fever, headache, acne, arthralgias, paraesthesias, lymphadenopathy, easy bleeding, arrhythmias, and allergic reaction.
Olanzapine Pregnancy And Lactation Text: This medication is pregnancy category C.   There are no adequate and well controlled trials with olanzapine in pregnant females.  Olanzapine should be used during pregnancy only if the potential benefit justifies the potential risk to the fetus.   In a study in lactating healthy women, olanzapine was excreted in breast milk.  It is recommended that women taking olanzapine should not breast feed.
Opzelura Pregnancy And Lactation Text: There is insufficient data to evaluate drug-associated risk for major birth defects, miscarriage, or other adverse maternal or fetal outcomes.  There is a pregnancy registry that monitors pregnancy outcomes in pregnant persons exposed to the medication during pregnancy.  It is unknown if this medication is excreted in breast milk.  Do not breastfeed during treatment and for about 4 weeks after the last dose.
Hydroxychloroquine Counseling:  I discussed with the patient that a baseline ophthalmologic exam is needed at the start of therapy and every year thereafter while on therapy. A CBC may also be warranted for monitoring.  The side effects of this medication were discussed with the patient, including but not limited to agranulocytosis, aplastic anemia, seizures, rashes, retinopathy, and liver toxicity. Patient instructed to call the office should any adverse effect occur.  The patient verbalized understanding of the proper use and possible adverse effects of Plaquenil.  All the patient's questions and concerns were addressed.
Azelaic Acid Counseling: Patient counseled that medicine may cause skin irritation and to avoid applying near the eyes.  In the event of skin irritation, the patient was advised to reduce the amount of the drug applied or use it less frequently.   The patient verbalized understanding of the proper use and possible adverse effects of azelaic acid.  All of the patient's questions and concerns were addressed.
Clofazimine Pregnancy And Lactation Text: This medication is Pregnancy Category C and isn't considered safe during pregnancy. It is excreted in breast milk.
Thalidomide Pregnancy And Lactation Text: This medication is Pregnancy Category X and is absolutely contraindicated during pregnancy. It is unknown if it is excreted in breast milk.
Rinvoq Counseling: I discussed with the patient the risks of Rinvoq therapy including but not limited to upper respiratory tract infections, shingles, cold sores, bronchitis, nausea, cough, fever, acne, and headache. Live vaccines should be avoided.  This medication has been linked to serious infections; higher rate of mortality; malignancy and lymphoproliferative disorders; major adverse cardiovascular events; thrombosis; thrombocytopenia, anemia, and neutropenia; lipid elevations; liver enzyme elevations; and gastrointestinal perforations.
Odomzo Counseling- I discussed with the patient the risks of Odomzo including but not limited to nausea, vomiting, diarrhea, constipation, weight loss, changes in the sense of taste, decreased appetite, muscle spasms, and hair loss.  The patient verbalized understanding of the proper use and possible adverse effects of Odomzo.  All of the patient's questions and concerns were addressed.
Ketoconazole Counseling:   Patient counseled regarding improving absorption with orange juice.  Adverse effects include but are not limited to breast enlargement, headache, diarrhea, nausea, upset stomach, liver function test abnormalities, taste disturbance, and stomach pain.  There is a rare possibility of liver failure that can occur when taking ketoconazole. The patient understands that monitoring of LFTs may be required, especially at baseline. The patient verbalized understanding of the proper use and possible adverse effects of ketoconazole.  All of the patient's questions and concerns were addressed.
Picato Counseling:  I discussed with the patient the risks of Picato including but not limited to erythema, scaling, itching, weeping, crusting, and pain.
Sski Pregnancy And Lactation Text: This medication is Pregnancy Category D and isn't considered safe during pregnancy. It is excreted in breast milk.
Adbry Counseling: I discussed with the patient the risks of tralokinumab including but not limited to eye infection and irritation, cold sores, injection site reactions, worsening of asthma, allergic reactions and increased risk of parasitic infection.  Live vaccines should be avoided while taking tralokinumab. The patient understands that monitoring is required and they must alert us or the primary physician if symptoms of infection or other concerning signs are noted.
Bactrim Pregnancy And Lactation Text: This medication is Pregnancy Category D and is known to cause fetal risk.  It is also excreted in breast milk.
Enbrel Pregnancy And Lactation Text: This medication is Pregnancy Category B and is considered safe during pregnancy. It is unknown if this medication is excreted in breast milk.
Metronidazole Counseling:  I discussed with the patient the risks of metronidazole including but not limited to seizures, nausea/vomiting, a metallic taste in the mouth, nausea/vomiting and severe allergy.
Doxepin Counseling:  Patient advised that the medication is sedating and not to drive a car after taking this medication. Patient informed of potential adverse effects including but not limited to dry mouth, urinary retention, and blurry vision.  The patient verbalized understanding of the proper use and possible adverse effects of doxepin.  All of the patient's questions and concerns were addressed.
Tremfya Counseling: I discussed with the patient the risks of guselkumab including but not limited to immunosuppression, serious infections, and drug reactions.  The patient understands that monitoring is required including a PPD at baseline and must alert us or the primary physician if symptoms of infection or other concerning signs are noted.
Drysol Counseling:  I discussed with the patient the risks of drysol/aluminum chloride including but not limited to skin rash, itching, irritation, burning.
Tetracycline Counseling: Patient counseled regarding possible photosensitivity and increased risk for sunburn.  Patient instructed to avoid sunlight, if possible.  When exposed to sunlight, patients should wear protective clothing, sunglasses, and sunscreen.  The patient was instructed to call the office immediately if the following severe adverse effects occur:  hearing changes, easy bruising/bleeding, severe headache, or vision changes.  The patient verbalized understanding of the proper use and possible adverse effects of tetracycline.  All of the patient's questions and concerns were addressed. Patient understands to avoid pregnancy while on therapy due to potential birth defects.
Prednisone Counseling:  I discussed with the patient the risks of prolonged use of prednisone including but not limited to weight gain, insomnia, osteoporosis, mood changes, diabetes, susceptibility to infection, glaucoma and high blood pressure.  In cases where prednisone use is prolonged, patients should be monitored with blood pressure checks, serum glucose levels and an eye exam.  Additionally, the patient may need to be placed on GI prophylaxis, PCP prophylaxis, and calcium and vitamin D supplementation and/or a bisphosphonate.  The patient verbalized understanding of the proper use and the possible adverse effects of prednisone.  All of the patient's questions and concerns were addressed.
Topical Clindamycin Counseling: Patient counseled that this medication may cause skin irritation or allergic reactions.  In the event of skin irritation, the patient was advised to reduce the amount of the drug applied or use it less frequently.   The patient verbalized understanding of the proper use and possible adverse effects of clindamycin.  All of the patient's questions and concerns were addressed.
Topical Sulfur Applications Pregnancy And Lactation Text: This medication is considered safe during pregnancy and breast feeding secondary to limited systemic absorption.
Humira Counseling:  I discussed with the patient the risks of adalimumab including but not limited to myelosuppression, immunosuppression, autoimmune hepatitis, demyelinating diseases, lymphoma, and serious infections.  The patient understands that monitoring is required including a PPD at baseline and must alert us or the primary physician if symptoms of infection or other concerning signs are noted.
Metronidazole Pregnancy And Lactation Text: This medication is Pregnancy Category B and considered safe during pregnancy.  It is also excreted in breast milk.
Cephalexin Counseling: I counseled the patient regarding use of cephalexin as an antibiotic for prophylactic and/or therapeutic purposes. Cephalexin (commonly prescribed under brand name Keflex) is a cephalosporin antibiotic which is active against numerous classes of bacteria, including most skin bacteria. Side effects may include nausea, diarrhea, gastrointestinal upset, rash, hives, yeast infections, and in rare cases, hepatitis, kidney disease, seizures, fever, confusion, neurologic symptoms, and others. Patients with severe allergies to penicillin medications are cautioned that there is about a 10% incidence of cross-reactivity with cephalosporins. When possible, patients with penicillin allergies should use alternatives to cephalosporins for antibiotic therapy.
Adbry Pregnancy And Lactation Text: It is unknown if this medication will adversely affect pregnancy or breast feeding.
Rhofade Pregnancy And Lactation Text: This medication has not been assigned a Pregnancy Risk Category. It is unknown if the medication is excreted in breast milk.
Oral Minoxidil Counseling- I discussed with the patient the risks of oral minoxidil including but not limited to shortness of breath, swelling of the feet or ankles, dizziness, lightheadedness, unwanted hair growth and allergic reaction.  The patient verbalized understanding of the proper use and possible adverse effects of oral minoxidil.  All of the patient's questions and concerns were addressed.
Hydroxychloroquine Pregnancy And Lactation Text: This medication has been shown to cause fetal harm but it isn't assigned a Pregnancy Risk Category. There are small amounts excreted in breast milk.
Colchicine Counseling:  Patient counseled regarding adverse effects including but not limited to stomach upset (nausea, vomiting, stomach pain, or diarrhea).  Patient instructed to limit alcohol consumption while taking this medication.  Colchicine may reduce blood counts especially with prolonged use.  The patient understands that monitoring of kidney function and blood counts may be required, especially at baseline. The patient verbalized understanding of the proper use and possible adverse effects of colchicine.  All of the patient's questions and concerns were addressed.
Spironolactone Pregnancy And Lactation Text: This medication can cause feminization of the male fetus and should be avoided during pregnancy. The active metabolite is also found in breast milk.
Imiquimod Pregnancy And Lactation Text: This medication is Pregnancy Category C. It is unknown if this medication is excreted in breast milk.
Bexarotene Pregnancy And Lactation Text: This medication is Pregnancy Category X and should not be given to women who are pregnant or may become pregnant. This medication should not be used if you are breast feeding.
Zyclara Counseling:  I discussed with the patient the risks of imiquimod including but not limited to erythema, scaling, itching, weeping, crusting, and pain.  Patient understands that the inflammatory response to imiquimod is variable from person to person and was educated regarded proper titration schedule.  If flu-like symptoms develop, patient knows to discontinue the medication and contact us.
Azelaic Acid Pregnancy And Lactation Text: This medication is considered safe during pregnancy and breast feeding.
Rinvoq Pregnancy And Lactation Text: Based on animal studies, Rinvoq may cause embryo-fetal harm when administered to pregnant women.  The medication should not be used in pregnancy.  Breastfeeding is not recommended during treatment and for 6 days after the last dose.
Klisyri Counseling:  I discussed with the patient the risks of Klisyri including but not limited to erythema, scaling, itching, weeping, crusting, and pain.
Oral Minoxidil Pregnancy And Lactation Text: This medication should only be used when clearly needed if you are pregnant, attempting to become pregnant or breast feeding.
Doxepin Pregnancy And Lactation Text: This medication is Pregnancy Category C and it isn't known if it is safe during pregnancy. It is also excreted in breast milk and breast feeding isn't recommended.
Tranexamic Acid Counseling:  Patient advised of the small risk of bleeding problems with tranexamic acid. They were also instructed to call if they developed any nausea, vomiting or diarrhea. All of the patient's questions and concerns were addressed.
Ketoconazole Pregnancy And Lactation Text: This medication is Pregnancy Category C and it isn't know if it is safe during pregnancy. It is also excreted in breast milk and breast feeding isn't recommended.
Simponi Counseling:  I discussed with the patient the risks of golimumab including but not limited to myelosuppression, immunosuppression, autoimmune hepatitis, demyelinating diseases, lymphoma, and serious infections.  The patient understands that monitoring is required including a PPD at baseline and must alert us or the primary physician if symptoms of infection or other concerning signs are noted.
Minocycline Counseling: Patient advised regarding possible photosensitivity and discoloration of the teeth, skin, lips, tongue and gums.  Patient instructed to avoid sunlight, if possible.  When exposed to sunlight, patients should wear protective clothing, sunglasses, and sunscreen.  The patient was instructed to call the office immediately if the following severe adverse effects occur:  hearing changes, easy bruising/bleeding, severe headache, or vision changes.  The patient verbalized understanding of the proper use and possible adverse effects of minocycline.  All of the patient's questions and concerns were addressed.
Solaraze Counseling:  I discussed with the patient the risks of Solaraze including but not limited to erythema, scaling, itching, weeping, crusting, and pain.
Detail Level: Simple
Topical Clindamycin Pregnancy And Lactation Text: This medication is Pregnancy Category B and is considered safe during pregnancy. It is unknown if it is excreted in breast milk.
Cephalexin Pregnancy And Lactation Text: This medication is Pregnancy Category B and considered safe during pregnancy.  It is also excreted in breast milk but can be used safely for shorter doses.
Tranexamic Acid Pregnancy And Lactation Text: It is unknown if this medication is safe during pregnancy or breast feeding.
Azathioprine Counseling:  I discussed with the patient the risks of azathioprine including but not limited to myelosuppression, immunosuppression, hepatotoxicity, lymphoma, and infections.  The patient understands that monitoring is required including baseline LFTs, Creatinine, possible TPMP genotyping and weekly CBCs for the first month and then every 2 weeks thereafter.  The patient verbalized understanding of the proper use and possible adverse effects of azathioprine.  All of the patient's questions and concerns were addressed.
Hydroxyzine Counseling: Patient advised that the medication is sedating and not to drive a car after taking this medication.  Patient informed of potential adverse effects including but not limited to dry mouth, urinary retention, and blurry vision.  The patient verbalized understanding of the proper use and possible adverse effects of hydroxyzine.  All of the patient's questions and concerns were addressed.
Cimzia Counseling:  I discussed with the patient the risks of Cimzia including but not limited to immunosuppression, allergic reactions and infections.  The patient understands that monitoring is required including a PPD at baseline and must alert us or the primary physician if symptoms of infection or other concerning signs are noted.
Isotretinoin Counseling: Patient should get monthly blood tests, not donate blood, not drive at night if vision affected, not share medication, and not undergo elective surgery for 6 months after tx completed. Side effects reviewed, pt to contact office should one occur.
Low Dose Naltrexone Counseling- I discussed with the patient the potential risks and side effects of low dose naltrexone including but not limited to: more vivid dreams, headaches, nausea, vomiting, abdominal pain, fatigue, dizziness, and anxiety.
Benzoyl Peroxide Counseling: Patient counseled that medicine may cause skin irritation and bleach clothing.  In the event of skin irritation, the patient was advised to reduce the amount of the drug applied or use it less frequently.   The patient verbalized understanding of the proper use and possible adverse effects of benzoyl peroxide.  All of the patient's questions and concerns were addressed.
Prednisone Pregnancy And Lactation Text: This medication is Pregnancy Category C and it isn't know if it is safe during pregnancy. This medication is excreted in breast milk.
Wartpeel Counseling:  I discussed with the patient the risks of Wartpeel including but not limited to erythema, scaling, itching, weeping, crusting, and pain.
Dapsone Counseling: I discussed with the patient the risks of dapsone including but not limited to hemolytic anemia, agranulocytosis, rashes, methemoglobinemia, kidney failure, peripheral neuropathy, headaches, GI upset, and liver toxicity.  Patients who start dapsone require monitoring including baseline LFTs and weekly CBCs for the first month, then every month thereafter.  The patient verbalized understanding of the proper use and possible adverse effects of dapsone.  All of the patient's questions and concerns were addressed.
Protopic Counseling: Patient may experience a mild burning sensation during topical application. Protopic is not approved in children less than 2 years of age. There have been case reports of hematologic and skin malignancies in patients using topical calcineurin inhibitors although causality is questionable.
Xolair Counseling:  Patient informed of potential adverse effects including but not limited to fever, muscle aches, rash and allergic reactions.  The patient verbalized understanding of the proper use and possible adverse effects of Xolair.  All of the patient's questions and concerns were addressed.
Benzoyl Peroxide Pregnancy And Lactation Text: This medication is Pregnancy Category C. It is unknown if benzoyl peroxide is excreted in breast milk.
Klisyri Pregnancy And Lactation Text: It is unknown if this medication can harm a developing fetus or if it is excreted in breast milk.
Elidel Counseling: Patient may experience a mild burning sensation during topical application. Elidel is not approved in children less than 2 years of age. There have been case reports of hematologic and skin malignancies in patients using topical calcineurin inhibitors although causality is questionable.
Low Dose Naltrexone Pregnancy And Lactation Text: Naltrexone is pregnancy category C.  There have been no adequate and well-controlled studies in pregnant women.  It should be used in pregnancy only if the potential benefit justifies the potential risk to the fetus.   Limited data indicates that naltrexone is minimally excreted into breastmilk.
Terbinafine Counseling: Patient counseling regarding adverse effects of terbinafine including but not limited to headache, diarrhea, rash, upset stomach, liver function test abnormalities, itching, taste/smell disturbance, nausea, abdominal pain, and flatulence.  There is a rare possibility of liver failure that can occur when taking terbinafine.  The patient understands that a baseline LFT and kidney function test may be required. The patient verbalized understanding of the proper use and possible adverse effects of terbinafine.  All of the patient's questions and concerns were addressed.
Sotyktu Counseling:  I discussed the most common side effects of Sotyktu including: common cold, sore throat, sinus infections, cold sores, canker sores, folliculitis, and acne.  I also discussed more serious side effects of Sotyktu including but not limited to: serious allergic reactions; increased risk for infections such as TB; cancers such as lymphomas; rhabdomyolysis and elevated CPK; and elevated triglycerides and liver enzymes. 
Otezla Counseling: The side effects of Otezla were discussed with the patient, including but not limited to worsening or new depression, weight loss, diarrhea, nausea, upper respiratory tract infection, and headache. Patient instructed to call the office should any adverse effect occur.  The patient verbalized understanding of the proper use and possible adverse effects of Otezla.  All the patient's questions and concerns were addressed.
Cimzia Pregnancy And Lactation Text: This medication crosses the placenta but can be considered safe in certain situations. Cimzia may be excreted in breast milk.
Azathioprine Pregnancy And Lactation Text: This medication is Pregnancy Category D and isn't considered safe during pregnancy. It is unknown if this medication is excreted in breast milk.
Sotyktu Pregnancy And Lactation Text: There is insufficient data to evaluate whether or not Sotyktu is safe to use during pregnancy.   It is not known if Sotyktu passes into breast milk and whether or not it is safe to use when breastfeeding.  
Valtrex Counseling: I discussed with the patient the risks of valacyclovir including but not limited to kidney damage, nausea, vomiting and severe allergy.  The patient understands that if the infection seems to be worsening or is not improving, they are to call.
Opioid Counseling: I discussed with the patient the potential side effects of opioids including but not limited to addiction, altered mental status, and depression. I stressed avoiding alcohol, benzodiazepines, muscle relaxants and sleep aids unless specifically okayed by a physician. The patient verbalized understanding of the proper use and possible adverse effects of opioids. All of the patient's questions and concerns were addressed. They were instructed to flush the remaining pills down the toilet if they did not need them for pain.
Solaraze Pregnancy And Lactation Text: This medication is Pregnancy Category B and is considered safe. There is some data to suggest avoiding during the third trimester. It is unknown if this medication is excreted in breast milk.
Isotretinoin Pregnancy And Lactation Text: This medication is Pregnancy Category X and is considered extremely dangerous during pregnancy. It is unknown if it is excreted in breast milk.
Topical Ketoconazole Counseling: Patient counseled that this medication may cause skin irritation or allergic reactions.  In the event of skin irritation, the patient was advised to reduce the amount of the drug applied or use it less frequently.   The patient verbalized understanding of the proper use and possible adverse effects of ketoconazole.  All of the patient's questions and concerns were addressed.
Ilumya Counseling: I discussed with the patient the risks of tildrakizumab including but not limited to immunosuppression, malignancy, posterior leukoencephalopathy syndrome, and serious infections.  The patient understands that monitoring is required including a PPD at baseline and must alert us or the primary physician if symptoms of infection or other concerning signs are noted.
Dutasteride Male Counseling: Dustasteride Counseling:  I discussed with the patient the risks of use of dutasteride including but not limited to decreased libido, decreased ejaculate volume, and gynecomastia. Women who can become pregnant should not handle medication.  All of the patient's questions and concerns were addressed.
Hydroxyzine Pregnancy And Lactation Text: This medication is not safe during pregnancy and should not be taken. It is also excreted in breast milk and breast feeding isn't recommended.
Clindamycin Counseling: I counseled the patient regarding use of clindamycin as an antibiotic for prophylactic and/or therapeutic purposes. Clindamycin is active against numerous classes of bacteria, including skin bacteria. Side effects may include nausea, diarrhea, gastrointestinal upset, rash, hives, yeast infections, and in rare cases, colitis.
Include Pregnancy/Lactation Warning?: No
Dutasteride Pregnancy And Lactation Text: This medication is absolutely contraindicated in women, especially during pregnancy and breast feeding. Feminization of male fetuses is possible if taking while pregnant.
Minoxidil Counseling: Minoxidil is a topical medication which can increase blood flow where it is applied. It is uncertain how this medication increases hair growth. Side effects are uncommon and include stinging and allergic reactions.
Dapsone Pregnancy And Lactation Text: This medication is Pregnancy Category C and is not considered safe during pregnancy or breast feeding.
Otezla Pregnancy And Lactation Text: This medication is Pregnancy Category C and it isn't known if it is safe during pregnancy. It is unknown if it is excreted in breast milk.
Winlevi Counseling:  I discussed with the patient the risks of topical clascoterone including but not limited to erythema, scaling, itching, and stinging. Patient voiced their understanding.
Carac Counseling:  I discussed with the patient the risks of Carac including but not limited to erythema, scaling, itching, weeping, crusting, and pain.
High Dose Vitamin A Counseling: Side effects reviewed, pt to contact office should one occur.
Skyrizi Counseling: I discussed with the patient the risks of risankizumab-rzaa including but not limited to immunosuppression, and serious infections.  The patient understands that monitoring is required including a PPD at baseline and must alert us or the primary physician if symptoms of infection or other concerning signs are noted.
Fluconazole Counseling:  Patient counseled regarding adverse effects of fluconazole including but not limited to headache, diarrhea, nausea, upset stomach, liver function test abnormalities, taste disturbance, and stomach pain.  There is a rare possibility of liver failure that can occur when taking fluconazole.  The patient understands that monitoring of LFTs and kidney function test may be required, especially at baseline. The patient verbalized understanding of the proper use and possible adverse effects of fluconazole.  All of the patient's questions and concerns were addressed.
Cibinqo Counseling: I discussed with the patient the risks of Cibinqo therapy including but not limited to common cold, nausea, headache, cold sores, increased blood CPK levels, dizziness, UTIs, fatigue, acne, and vomitting. Live vaccines should be avoided.  This medication has been linked to serious infections; higher rate of mortality; malignancy and lymphoproliferative disorders; major adverse cardiovascular events; thrombosis; thrombocytopenia and lymphopenia; lipid elevations; and retinal detachment.
Protopic Pregnancy And Lactation Text: This medication is Pregnancy Category C. It is unknown if this medication is excreted in breast milk when applied topically.
Niacinamide Counseling: I recommended taking niacin or niacinamide, also know as vitamin B3, twice daily. Recent evidence suggests that taking vitamin B3 (500 mg twice daily) can reduce the risk of actinic keratoses and non-melanoma skin cancers. Side effects of vitamin B3 include flushing and headache.
Valtrex Pregnancy And Lactation Text: this medication is Pregnancy Category B and is considered safe during pregnancy. This medication is not directly found in breast milk but it's metabolite acyclovir is present.
Xeljanz Counseling: I discussed with the patient the risks of Xeljanz therapy including increased risk of infection, liver issues, headache, diarrhea, or cold symptoms. Live vaccines should be avoided. They were instructed to call if they have any problems.
Cellcept Counseling:  I discussed with the patient the risks of mycophenolate mofetil including but not limited to infection/immunosuppression, GI upset, hypokalemia, hypercholesterolemia, bone marrow suppression, lymphoproliferative disorders, malignancy, GI ulceration/bleed/perforation, colitis, interstitial lung disease, kidney failure, progressive multifocal leukoencephalopathy, and birth defects.  The patient understands that monitoring is required including a baseline creatinine and regular CBC testing. In addition, patient must alert us immediately if symptoms of infection or other concerning signs are noted.
Cibinqo Pregnancy And Lactation Text: It is unknown if this medication will adversely affect pregnancy or breast feeding.  You should not take this medication if you are currently pregnant or planning a pregnancy or while breastfeeding.
Cosentyx Counseling:  I discussed with the patient the risks of Cosentyx including but not limited to worsening of Crohn's disease, immunosuppression, allergic reactions and infections.  The patient understands that monitoring is required including a PPD at baseline and must alert us or the primary physician if symptoms of infection or other concerning signs are noted.
Soolantra Counseling: I discussed with the patients the risks of topial Soolantra. This is a medicine which decreases the number of mites and inflammation in the skin. You experience burning, stinging, eye irritation or allergic reactions.  Please call our office if you develop any problems from using this medication.
Quinolones Counseling:  I discussed with the patient the risks of fluoroquinolones including but not limited to GI upset, allergic reaction, drug rash, diarrhea, dizziness, photosensitivity, yeast infections, liver function test abnormalities, tendonitis/tendon rupture.
Xolair Pregnancy And Lactation Text: This medication is Pregnancy Category B and is considered safe during pregnancy. This medication is excreted in breast milk.
Clindamycin Pregnancy And Lactation Text: This medication can be used in pregnancy if certain situations. Clindamycin is also present in breast milk.
Opioid Pregnancy And Lactation Text: These medications can lead to premature delivery and should be avoided during pregnancy. These medications are also present in breast milk in small amounts.
Winlevi Pregnancy And Lactation Text: This medication is considered safe during pregnancy and breastfeeding.
Eucrisa Counseling: Patient may experience a mild burning sensation during topical application. Eucrisa is not approved in children less than 3 months of age.
Topical Metronidazole Counseling: Metronidazole is a topical antibiotic medication. You may experience burning, stinging, redness, or allergic reactions.  Please call our office if you develop any problems from using this medication.
Cimetidine Counseling:  I discussed with the patient the risks of Cimetidine including but not limited to gynecomastia, headache, diarrhea, nausea, drowsiness, arrhythmias, pancreatitis, skin rashes, psychosis, bone marrow suppression and kidney toxicity.
Infliximab Counseling:  I discussed with the patient the risks of infliximab including but not limited to myelosuppression, immunosuppression, autoimmune hepatitis, demyelinating diseases, lymphoma, and serious infections.  The patient understands that monitoring is required including a PPD at baseline and must alert us or the primary physician if symptoms of infection or other concerning signs are noted.
Doxycycline Counseling:  Patient counseled regarding possible photosensitivity and increased risk for sunburn.  Patient instructed to avoid sunlight, if possible.  When exposed to sunlight, patients should wear protective clothing, sunglasses, and sunscreen.  The patient was instructed to call the office immediately if the following severe adverse effects occur:  hearing changes, easy bruising/bleeding, severe headache, or vision changes.  The patient verbalized understanding of the proper use and possible adverse effects of doxycycline.  All of the patient's questions and concerns were addressed.
Soolantra Pregnancy And Lactation Text: This medication is Pregnancy Category C. This medication is considered safe during breast feeding.
Oxybutynin Counseling:  I discussed with the patient the risks of oxybutynin including but not limited to skin rash, drowsiness, dry mouth, difficulty urinating, and blurred vision.
Niacinamide Pregnancy And Lactation Text: These medications are considered safe during pregnancy.
Qbrexza Counseling:  I discussed with the patient the risks of Qbrexza including but not limited to headache, mydriasis, blurred vision, dry eyes, nasal dryness, dry mouth, dry throat, dry skin, urinary hesitation, and constipation.  Local skin reactions including erythema, burning, stinging, and itching can also occur.
Gabapentin Counseling: I discussed with the patient the risks of gabapentin including but not limited to dizziness, somnolence, fatigue and ataxia.
Minoxidil Pregnancy And Lactation Text: This medication has not been assigned a Pregnancy Risk Category but animal studies failed to show danger with the topical medication. It is unknown if the medication is excreted in breast milk.
Finasteride Male Counseling: Finasteride Counseling:  I discussed with the patient the risks of use of finasteride including but not limited to decreased libido, decreased ejaculate volume, gynecomastia, and depression. Women should not handle medication.  All of the patient's questions and concerns were addressed.
Albendazole Counseling:  I discussed with the patient the risks of albendazole including but not limited to cytopenia, kidney damage, nausea/vomiting and severe allergy.  The patient understands that this medication is being used in an off-label manner.
High Dose Vitamin A Pregnancy And Lactation Text: High dose vitamin A therapy is contraindicated during pregnancy and breast feeding.
Xeljohnz Pregnancy And Lactation Text: This medication is Pregnancy Category D and is not considered safe during pregnancy.  The risk during breast feeding is also uncertain.
Erivedge Counseling- I discussed with the patient the risks of Erivedge including but not limited to nausea, vomiting, diarrhea, constipation, weight loss, changes in the sense of taste, decreased appetite, muscle spasms, and hair loss.  The patient verbalized understanding of the proper use and possible adverse effects of Erivedge.  All of the patient's questions and concerns were addressed.
Albendazole Pregnancy And Lactation Text: This medication is Pregnancy Category C and it isn't known if it is safe during pregnancy. It is also excreted in breast milk.
Griseofulvin Counseling:  I discussed with the patient the risks of griseofulvin including but not limited to photosensitivity, cytopenia, liver damage, nausea/vomiting and severe allergy.  The patient understands that this medication is best absorbed when taken with a fatty meal (e.g., ice cream or french fries).
Mirvaso Counseling: Mirvaso is a topical medication which can decrease superficial blood flow where applied. Side effects are uncommon and include stinging, redness and allergic reactions.
Litfulo Counseling: I discussed with the patient the risks of Litfulo therapy including but not limited to upper respiratory tract infections, shingles, cold sores, and nausea. Live vaccines should be avoided.  This medication has been linked to serious infections; higher rate of mortality; malignancy and lymphoproliferative disorders; major adverse cardiovascular events; thrombosis; gastrointestinal perforations; neutropenia; lymphopenia; anemia; liver enzyme elevations; and lipid elevations.
Stelara Counseling:  I discussed with the patient the risks of ustekinumab including but not limited to immunosuppression, malignancy, posterior leukoencephalopathy syndrome, and serious infections.  The patient understands that monitoring is required including a PPD at baseline and must alert us or the primary physician if symptoms of infection or other concerning signs are noted.
Rifampin Counseling: I discussed with the patient the risks of rifampin including but not limited to liver damage, kidney damage, red-orange body fluids, nausea/vomiting and severe allergy.
Topical Retinoid counseling:  Patient advised to apply a pea-sized amount only at bedtime and wait 30 minutes after washing their face before applying.  If too drying, patient may add a non-comedogenic moisturizer. The patient verbalized understanding of the proper use and possible adverse effects of retinoids.  All of the patient's questions and concerns were addressed.
Topical Metronidazole Pregnancy And Lactation Text: This medication is Pregnancy Category B and considered safe during pregnancy.  It is also considered safe to use while breastfeeding.
Doxycycline Pregnancy And Lactation Text: This medication is Pregnancy Category D and not consider safe during pregnancy. It is also excreted in breast milk but is considered safe for shorter treatment courses.
Dupixent Counseling: I discussed with the patient the risks of dupilumab including but not limited to eye inflammation and irritation, cold sores, injection site reactions, allergic reactions and increased risk of parasitic infection. The patient understands that monitoring is required and they must alert us or the primary physician if symptoms of infection or other concerning signs are noted.
Azithromycin Counseling:  I discussed with the patient the risks of azithromycin including but not limited to GI upset, allergic reaction, drug rash, diarrhea, and yeast infections.
Cyclophosphamide Counseling:  I discussed with the patient the risks of cyclophosphamide including but not limited to hair loss, hormonal abnormalities, decreased fertility, abdominal pain, diarrhea, nausea and vomiting, bone marrow suppression and infection. The patient understands that monitoring is required while taking this medication.
Calcipotriene Pregnancy And Lactation Text: The use of this medication during pregnancy or lactation is not recommended as there is insufficient data.
Nsaids Counseling: NSAID Counseling: I discussed with the patient that NSAIDs should be taken with food. Prolonged use of NSAIDs can result in the development of stomach ulcers.  Patient advised to stop taking NSAIDs if abdominal pain occurs.  The patient verbalized understanding of the proper use and possible adverse effects of NSAIDs.  All of the patient's questions and concerns were addressed.
Qbrexza Pregnancy And Lactation Text: There is no available data on Qbrexza use in pregnant women.  There is no available data on Qbrexza use in lactation.
Calcipotriene Counseling:  I discussed with the patient the risks of calcipotriene including but not limited to erythema, scaling, itching, and irritation.
Finasteride Pregnancy And Lactation Text: This medication is absolutely contraindicated during pregnancy. It is unknown if it is excreted in breast milk.
VTAMA Counseling: I discussed with the patient that VTAMA is not for use in the eyes, mouth or mouth. They should call the office if they develop any signs of allergic reactions to VTAMA. The patient verbalized understanding of the proper use and possible adverse effects of VTAMA.  All of the patient's questions and concerns were addressed.
Arava Counseling:  Patient counseled regarding adverse effects of Arava including but not limited to nausea, vomiting, abnormalities in liver function tests. Patients may develop mouth sores, rash, diarrhea, and abnormalities in blood counts. The patient understands that monitoring is required including LFTs and blood counts.  There is a rare possibility of scarring of the liver and lung problems that can occur when taking methotrexate. Persistent nausea, loss of appetite, pale stools, dark urine, cough, and shortness of breath should be reported immediately. Patient advised to discontinue Arava treatment and consult with a physician prior to attempting conception. The patient will have to undergo a treatment to eliminate Arava from the body prior to conception.
Ivermectin Counseling:  Patient instructed to take medication on an empty stomach with a full glass of water.  Patient informed of potential adverse effects including but not limited to nausea, diarrhea, dizziness, itching, and swelling of the extremities or lymph nodes.  The patient verbalized understanding of the proper use and possible adverse effects of ivermectin.  All of the patient's questions and concerns were addressed.
Litfulo Pregnancy And Lactation Text: Based on animal studies, Lifulo may cause embryo-fetal harm when administered to pregnant women.  The medication should not be used in pregnancy.  Breastfeeding is not recommended during treatment.
Glycopyrrolate Counseling:  I discussed with the patient the risks of glycopyrrolate including but not limited to skin rash, drowsiness, dry mouth, difficulty urinating, and blurred vision.
Rhofade Counseling: Rhofade is a topical medication which can decrease superficial blood flow where applied. Side effects are uncommon and include stinging, redness and allergic reactions.
Hydroquinone Counseling:  Patient advised that medication may result in skin irritation, lightening (hypopigmentation), dryness, and burning.  In the event of skin irritation, the patient was advised to reduce the amount of the drug applied or use it less frequently.  Rarely, spots that are treated with hydroquinone can become darker (pseudoochronosis).  Should this occur, patient instructed to stop medication and call the office. The patient verbalized understanding of the proper use and possible adverse effects of hydroquinone.  All of the patient's questions and concerns were addressed.
Nsaids Pregnancy And Lactation Text: These medications are considered safe up to 30 weeks gestation. It is excreted in breast milk.
Propranolol Counseling:  I discussed with the patient the risks of propranolol including but not limited to low heart rate, low blood pressure, low blood sugar, restlessness and increased cold sensitivity. They should call the office if they experience any of these side effects.
Griseofulvin Pregnancy And Lactation Text: This medication is Pregnancy Category X and is known to cause serious birth defects. It is unknown if this medication is excreted in breast milk but breast feeding should be avoided.
Rifampin Pregnancy And Lactation Text: This medication is Pregnancy Category C and it isn't know if it is safe during pregnancy. It is also excreted in breast milk and should not be used if you are breast feeding.
Dupixent Pregnancy And Lactation Text: This medication likely crosses the placenta but the risk for the fetus is uncertain. This medication is excreted in breast milk.
Rituxan Counseling:  I discussed with the patient the risks of Rituxan infusions. Side effects can include infusion reactions, severe drug rashes including mucocutaneous reactions, reactivation of latent hepatitis and other infections and rarely progressive multifocal leukoencephalopathy.  All of the patient's questions and concerns were addressed.
Cyclophosphamide Pregnancy And Lactation Text: This medication is Pregnancy Category D and it isn't considered safe during pregnancy. This medication is excreted in breast milk.
Acitretin Counseling:  I discussed with the patient the risks of acitretin including but not limited to hair loss, dry lips/skin/eyes, liver damage, hyperlipidemia, depression/suicidal ideation, photosensitivity.  Serious rare side effects can include but are not limited to pancreatitis, pseudotumor cerebri, bony changes, clot formation/stroke/heart attack.  Patient understands that alcohol is contraindicated since it can result in liver toxicity and significantly prolong the elimination of the drug by many years.
Cantharidin Counseling:  I discussed with the patient the risks of Cantharidin including but not limited to pain, redness, burning, itching, and blistering.
Birth Control Pills Counseling: Birth Control Pill Counseling: I discussed with the patient the potential side effects of OCPs including but not limited to increased risk of stroke, heart attack, thrombophlebitis, deep venous thrombosis, hepatic adenomas, breast changes, GI upset, headaches, and depression.  The patient verbalized understanding of the proper use and possible adverse effects of OCPs. All of the patient's questions and concerns were addressed.
Aklief counseling:  Patient advised to apply a pea-sized amount only at bedtime and wait 30 minutes after washing their face before applying.  If too drying, patient may add a non-comedogenic moisturizer.  The most commonly reported side effects including irritation, redness, scaling, dryness, stinging, burning, itching, and increased risk of sunburn.  The patient verbalized understanding of the proper use and possible adverse effects of retinoids.  All of the patient's questions and concerns were addressed.
Topical Steroids Counseling: I discussed with the patient that prolonged use of topical steroids can result in the increased appearance of superficial blood vessels (telangiectasias), lightening (hypopigmentation) and thinning of the skin (atrophy).  Patient understands to avoid using high potency steroids in skin folds, the groin or the face.  The patient verbalized understanding of the proper use and possible adverse effects of topical steroids.  All of the patient's questions and concerns were addressed.
Erythromycin Counseling:  I discussed with the patient the risks of erythromycin including but not limited to GI upset, allergic reaction, drug rash, diarrhea, increase in liver enzymes, and yeast infections.
Azithromycin Pregnancy And Lactation Text: This medication is considered safe during pregnancy and is also secreted in breast milk.
Clofazimine Counseling:  I discussed with the patient the risks of clofazimine including but not limited to skin and eye pigmentation, liver damage, nausea/vomiting, gastrointestinal bleeding and allergy.
Birth Control Pills Pregnancy And Lactation Text: This medication should be avoided if pregnant and for the first 30 days post-partum.
Acitretin Pregnancy And Lactation Text: This medication is Pregnancy Category X and should not be given to women who are pregnant or may become pregnant in the future. This medication is excreted in breast milk.
Opzelura Counseling:  I discussed with the patient the risks of Opzelura including but not limited to nasopharngitis, bronchitis, ear infection, eosinophila, hives, diarrhea, folliculitis, tonsillitis, and rhinorrhea.  Taken orally, this medication has been linked to serious infections; higher rate of mortality; malignancy and lymphoproliferative disorders; major adverse cardiovascular events; thrombosis; thrombocytopenia, anemia, and neutropenia; and lipid elevations.
Propranolol Pregnancy And Lactation Text: This medication is Pregnancy Category C and it isn't known if it is safe during pregnancy. It is excreted in breast milk.
5-Fu Counseling: 5-Fluorouracil Counseling:  I discussed with the patient the risks of 5-fluorouracil including but not limited to erythema, scaling, itching, weeping, crusting, and pain.
Topical Steroids Applications Pregnancy And Lactation Text: Most topical steroids are considered safe to use during pregnancy and lactation.  Any topical steroid applied to the breast or nipple should be washed off before breastfeeding.
Zoryve Counseling:  I discussed with the patient that Zoryve is not for use in the eyes, mouth or vagina. The most commonly reported side effects include diarrhea, headache, insomnia, application site pain, upper respiratory tract infections, and urinary tract infections.  All of the patient's questions and concerns were addressed.
Taltz Counseling: I discussed with the patient the risks of ixekizumab including but not limited to immunosuppression, serious infections, worsening of inflammatory bowel disease and drug reactions.  The patient understands that monitoring is required including a PPD at baseline and must alert us or the primary physician if symptoms of infection or other concerning signs are noted.
Aklief Pregnancy And Lactation Text: It is unknown if this medication is safe to use during pregnancy.  It is unknown if this medication is excreted in breast milk.  Breastfeeding women should use the topical cream on the smallest area of the skin for the shortest time needed while breastfeeding.  Do not apply to nipple and areola.
Glycopyrrolate Pregnancy And Lactation Text: This medication is Pregnancy Category B and is considered safe during pregnancy. It is unknown if it is excreted breast milk.
Methotrexate Counseling:  Patient counseled regarding adverse effects of methotrexate including but not limited to nausea, vomiting, abnormalities in liver function tests. Patients may develop mouth sores, rash, diarrhea, and abnormalities in blood counts. The patient understands that monitoring is required including LFT's and blood counts.  There is a rare possibility of scarring of the liver and lung problems that can occur when taking methotrexate. Persistent nausea, loss of appetite, pale stools, dark urine, cough, and shortness of breath should be reported immediately. Patient advised to discontinue methotrexate treatment at least three months before attempting to become pregnant.  I discussed the need for folate supplements while taking methotrexate.  These supplements can decrease side effects during methotrexate treatment. The patient verbalized understanding of the proper use and possible adverse effects of methotrexate.  All of the patient's questions and concerns were addressed.
Libtayo Counseling- I discussed with the patient the risks of Libtayo including but not limited to nausea, vomiting, diarrhea, and bone or muscle pain.  The patient verbalized understanding of the proper use and possible adverse effects of Libtayo.  All of the patient's questions and concerns were addressed.
Itraconazole Counseling:  I discussed with the patient the risks of itraconazole including but not limited to liver damage, nausea/vomiting, neuropathy, and severe allergy.  The patient understands that this medication is best absorbed when taken with acidic beverages such as non-diet cola or ginger ale.  The patient understands that monitoring is required including baseline LFTs and repeat LFTs at intervals.  The patient understands that they are to contact us or the primary physician if concerning signs are noted.
Cantharidin Pregnancy And Lactation Text: This medication has not been proven safe during pregnancy. It is unknown if this medication is excreted in breast milk.
Olumiant Counseling: I discussed with the patient the risks of Olumiant therapy including but not limited to upper respiratory tract infections, shingles, cold sores, and nausea. Live vaccines should be avoided.  This medication has been linked to serious infections; higher rate of mortality; malignancy and lymphoproliferative disorders; major adverse cardiovascular events; thrombosis; gastrointestinal perforations; neutropenia; lymphopenia; anemia; liver enzyme elevations; and lipid elevations.
Olanzapine Counseling- I discussed with the patient the common side effects of olanzapine including but are not limited to: lack of energy, dry mouth, increased appetite, sleepiness, tremor, constipation, dizziness, changes in behavior, or restlessness.  Explained that teenagers are more likely to experience headaches, abdominal pain, pain in the arms or legs, tiredness, and sleepiness.  Serious side effects include but are not limited: increased risk of death in elderly patients who are confused, have memory loss, or dementia-related psychosis; hyperglycemia; increased cholesterol and triglycerides; and weight gain.

## 2023-11-20 NOTE — PROCEDURE: PRESCRIPTION MEDICATION MANAGEMENT
Render In Strict Bullet Format?: No
Plan: Will avoid oral medication at this time due to patient’s recent hospitalization
Initiate Treatment: sulfacetamide sodium 10 % topical cleanser: Wash face with cleanser Qday\\n\\nmetronidazole 0.75% topical cream: Apply a thin layer to the face qday
Detail Level: Zone

## 2023-11-20 NOTE — PROCEDURE: PREMALIGNANT DESTRUCTION
Anesthesia Volume In Cc: 0.5
Consent: The patient's consent was obtained including but not limited to risks of crusting, scabbing, blistering, scarring, darker or lighter pigmentary change, recurrence, incomplete removal and infection.
Render Note In Bullet Format When Appropriate: No
Post-Care Instructions: I reviewed with the patient in detail post-care instructions. Patient is to wear sunprotection, and avoid picking at any of the treated lesions. Pt may apply Vaseline to crusted or scabbing areas.
Detail Level: Detailed
Method: liquid nitrogen
Post-Procedure Care (Optional): The wound was cleaned, and a pressure dressing was applied.  The patient received detailed post-op instructions.

## 2023-11-20 NOTE — HPI: RASH (ROSACEA)
How Severe Is Your Rosacea?: mild
Is This A New Presentation, Or A Follow-Up?: Rash
Additional History: Patient states he went to a wedding and ate something that had given him a virus in the colon which then caused injuries to the kidneys and he was hospitalized for three weeks. During this time, patient developed a rash on the face which he believes was due to the stress. Patient states he has always had rosacea and he has gotten treatment before but this was back in 2018.

## 2023-11-29 ENCOUNTER — RX ONLY (OUTPATIENT)
Age: 53
Setting detail: RX ONLY
End: 2023-11-29

## 2023-11-29 RX ORDER — CLINDAMYCIN PHOSPHATE 10 MG/ML
LOTION TOPICAL QDAY
Qty: 60 | Refills: 2 | Status: ERX | COMMUNITY
Start: 2023-11-29

## 2023-12-04 ENCOUNTER — APPOINTMENT (OUTPATIENT)
Dept: LAB | Facility: HOSPITAL | Age: 53
End: 2023-12-04
Attending: NURSE PRACTITIONER
Payer: COMMERCIAL

## 2023-12-04 ENCOUNTER — TRANSCRIBE ORDERS (OUTPATIENT)
Dept: LAB | Facility: HOSPITAL | Age: 53
End: 2023-12-04

## 2023-12-04 DIAGNOSIS — N17.9 ACUTE KIDNEY FAILURE, UNSPECIFIED (CMS/HCC): Primary | ICD-10-CM

## 2023-12-04 DIAGNOSIS — N17.9 ACUTE KIDNEY FAILURE, UNSPECIFIED (CMS/HCC): ICD-10-CM

## 2023-12-04 LAB
ANION GAP SERPL CALC-SCNC: 12 MEQ/L (ref 3–15)
BUN SERPL-MCNC: 29 MG/DL (ref 7–25)
CALCIUM SERPL-MCNC: 9.5 MG/DL (ref 8.6–10.3)
CHLORIDE SERPL-SCNC: 101 MEQ/L (ref 98–107)
CO2 SERPL-SCNC: 26 MEQ/L (ref 21–31)
CREAT SERPL-MCNC: 1.6 MG/DL (ref 0.7–1.3)
EGFRCR SERPLBLD CKD-EPI 2021: 50.9 ML/MIN/1.73M*2
GLUCOSE SERPL-MCNC: 183 MG/DL (ref 70–99)
POTASSIUM SERPL-SCNC: 4.4 MEQ/L (ref 3.5–5.1)
SODIUM SERPL-SCNC: 139 MEQ/L (ref 136–145)

## 2023-12-04 PROCEDURE — 80048 BASIC METABOLIC PNL TOTAL CA: CPT

## 2023-12-04 PROCEDURE — 36415 COLL VENOUS BLD VENIPUNCTURE: CPT

## 2024-01-15 ENCOUNTER — APPOINTMENT (RX ONLY)
Dept: URBAN - METROPOLITAN AREA CLINIC 28 | Facility: CLINIC | Age: 54
Setting detail: DERMATOLOGY
End: 2024-01-15

## 2024-01-15 DIAGNOSIS — L71.8 OTHER ROSACEA: ICD-10-CM | Status: UNCHANGED

## 2024-01-15 PROCEDURE — ? PRESCRIPTION MEDICATION MANAGEMENT

## 2024-01-15 PROCEDURE — 99214 OFFICE O/P EST MOD 30 MIN: CPT

## 2024-01-15 PROCEDURE — ? PRESCRIPTION

## 2024-01-15 PROCEDURE — ? COUNSELING

## 2024-01-15 RX ORDER — DOXYCYCLINE 100 MG/1
1 TABLET, FILM COATED ORAL BID
Qty: 60 | Refills: 3 | Status: ERX | COMMUNITY
Start: 2024-01-15

## 2024-01-15 RX ADMIN — DOXYCYCLINE 1: 100 TABLET, FILM COATED ORAL at 00:00

## 2024-01-15 ASSESSMENT — LOCATION SIMPLE DESCRIPTION DERM
LOCATION SIMPLE: RIGHT CHEEK
LOCATION SIMPLE: LEFT CHEEK

## 2024-01-15 ASSESSMENT — LOCATION ZONE DERM: LOCATION ZONE: FACE

## 2024-01-15 ASSESSMENT — LOCATION DETAILED DESCRIPTION DERM
LOCATION DETAILED: RIGHT CENTRAL MALAR CHEEK
LOCATION DETAILED: LEFT CENTRAL MALAR CHEEK

## 2024-01-15 NOTE — PROCEDURE: PRESCRIPTION MEDICATION MANAGEMENT
Discontinue Regimen: metronidazole 0.75% topical cream: Apply a thin layer to the face qday (pt didn’t get)
Render In Strict Bullet Format?: No
Plan: Unable to get some topicals covered.  Offered trial with combo cream - metronidazole/azelaic acid/ivermectin and/or doxycycline 100mg bid.  Will send both, he may try both or one for a month at first.
Initiate Treatment: Triple cream from [facility] (azelaic acid, ivermectin, metronidazole)\\ndoxycycline monohydrate 100mg tablet: Take one tab po BID with food and water (never on an empty stomach)
Detail Level: Zone
Continue Regimen: sulfacetamide sodium 10% topical cleanser: Wash face with cleanser Qday

## 2024-01-17 ENCOUNTER — RX ONLY (OUTPATIENT)
Age: 54
Setting detail: RX ONLY
End: 2024-01-17

## 2024-01-17 RX ORDER — DOXYCYCLINE 100 MG/1
1 TABLET, FILM COATED ORAL BID
Qty: 180 | Refills: 0 | Status: ERX

## 2024-01-20 ENCOUNTER — APPOINTMENT (OUTPATIENT)
Dept: LAB | Facility: HOSPITAL | Age: 54
End: 2024-01-20
Attending: NURSE PRACTITIONER
Payer: COMMERCIAL

## 2024-01-20 ENCOUNTER — TRANSCRIBE ORDERS (OUTPATIENT)
Dept: LAB | Facility: HOSPITAL | Age: 54
End: 2024-01-20

## 2024-01-20 DIAGNOSIS — N17.9 ACUTE KIDNEY FAILURE, UNSPECIFIED (CMS/HCC): Primary | ICD-10-CM

## 2024-01-20 DIAGNOSIS — N17.9 ACUTE KIDNEY FAILURE, UNSPECIFIED (CMS/HCC): ICD-10-CM

## 2024-01-20 LAB
ANION GAP SERPL CALC-SCNC: 5 MEQ/L (ref 3–15)
BUN SERPL-MCNC: 24 MG/DL (ref 7–25)
CALCIUM SERPL-MCNC: 9 MG/DL (ref 8.6–10.3)
CHLORIDE SERPL-SCNC: 102 MEQ/L (ref 98–107)
CO2 SERPL-SCNC: 30 MEQ/L (ref 21–31)
CREAT SERPL-MCNC: 1.4 MG/DL (ref 0.7–1.3)
EGFRCR SERPLBLD CKD-EPI 2021: 59.7 ML/MIN/1.73M*2
GLUCOSE SERPL-MCNC: 117 MG/DL (ref 70–99)
POTASSIUM SERPL-SCNC: 4.3 MEQ/L (ref 3.5–5.1)
SODIUM SERPL-SCNC: 137 MEQ/L (ref 136–145)

## 2024-01-20 PROCEDURE — 80048 BASIC METABOLIC PNL TOTAL CA: CPT

## 2024-01-20 PROCEDURE — 36415 COLL VENOUS BLD VENIPUNCTURE: CPT

## 2024-01-20 PROCEDURE — 82310 ASSAY OF CALCIUM: CPT

## 2024-04-20 ENCOUNTER — TRANSCRIBE ORDERS (OUTPATIENT)
Dept: LAB | Facility: HOSPITAL | Age: 54
End: 2024-04-20

## 2024-04-20 ENCOUNTER — APPOINTMENT (OUTPATIENT)
Dept: LAB | Facility: HOSPITAL | Age: 54
End: 2024-04-20
Attending: NURSE PRACTITIONER
Payer: COMMERCIAL

## 2024-04-20 DIAGNOSIS — N17.9 ACUTE KIDNEY FAILURE, UNSPECIFIED (CMS/HCC): Primary | ICD-10-CM

## 2024-04-20 DIAGNOSIS — N17.9 ACUTE KIDNEY FAILURE, UNSPECIFIED (CMS/HCC): ICD-10-CM

## 2024-04-20 LAB
ALBUMIN SERPL-MCNC: 4.3 G/DL (ref 3.5–5.7)
ANION GAP SERPL CALC-SCNC: 7 MEQ/L (ref 3–15)
BILIRUB UR QL STRIP.AUTO: NEGATIVE MG/DL
BUN SERPL-MCNC: 23 MG/DL (ref 7–25)
CALCIUM SERPL-MCNC: 8.9 MG/DL (ref 8.6–10.3)
CHLORIDE SERPL-SCNC: 102 MEQ/L (ref 98–107)
CLARITY UR REFRACT.AUTO: CLEAR
CO2 SERPL-SCNC: 30 MEQ/L (ref 21–31)
COLOR UR AUTO: YELLOW
CREAT SERPL-MCNC: 1.4 MG/DL (ref 0.7–1.3)
CREAT UR-MCNC: 95.6 MG/DL
EGFRCR SERPLBLD CKD-EPI 2021: 59.7 ML/MIN/1.73M*2
GLUCOSE SERPL-MCNC: 123 MG/DL (ref 70–99)
GLUCOSE UR STRIP.AUTO-MCNC: NEGATIVE MG/DL
HGB UR QL STRIP.AUTO: NEGATIVE
KETONES UR STRIP.AUTO-MCNC: NEGATIVE MG/DL
LEUKOCYTE ESTERASE UR QL STRIP.AUTO: NEGATIVE
NITRITE UR QL STRIP.AUTO: NEGATIVE
PH UR STRIP.AUTO: 6.5 [PH]
PHOSPHATE SERPL-MCNC: 2.9 MG/DL (ref 2.4–4.7)
POTASSIUM SERPL-SCNC: 4.2 MEQ/L (ref 3.5–5.1)
PROT UR QL STRIP.AUTO: NEGATIVE
PROT UR-MCNC: 14 MG/DL
PROT/CREAT UR: 0.15 MG/G CREAT
SODIUM SERPL-SCNC: 139 MEQ/L (ref 136–145)
SP GR UR REFRACT.AUTO: 1.01
UROBILINOGEN UR STRIP-ACNC: 0.2 EU/DL

## 2024-04-20 PROCEDURE — 80069 RENAL FUNCTION PANEL: CPT

## 2024-04-20 PROCEDURE — 81003 URINALYSIS AUTO W/O SCOPE: CPT

## 2024-04-20 PROCEDURE — 36415 COLL VENOUS BLD VENIPUNCTURE: CPT

## 2024-04-20 PROCEDURE — 82570 ASSAY OF URINE CREATININE: CPT

## 2024-05-25 ENCOUNTER — TRANSCRIBE ORDERS (OUTPATIENT)
Dept: LAB | Facility: HOSPITAL | Age: 54
End: 2024-05-25

## 2024-05-25 ENCOUNTER — APPOINTMENT (OUTPATIENT)
Dept: LAB | Facility: HOSPITAL | Age: 54
End: 2024-05-25
Attending: INTERNAL MEDICINE
Payer: COMMERCIAL

## 2024-05-25 DIAGNOSIS — N17.9 ACUTE KIDNEY FAILURE, UNSPECIFIED (CMS/HCC): Primary | ICD-10-CM

## 2024-05-25 DIAGNOSIS — N17.9 ACUTE KIDNEY FAILURE, UNSPECIFIED (CMS/HCC): ICD-10-CM

## 2024-05-25 LAB
ANION GAP SERPL CALC-SCNC: 9 MEQ/L (ref 3–15)
BUN SERPL-MCNC: 17 MG/DL (ref 7–25)
CALCIUM SERPL-MCNC: 9.4 MG/DL (ref 8.6–10.3)
CHLORIDE SERPL-SCNC: 101 MEQ/L (ref 98–107)
CO2 SERPL-SCNC: 27 MEQ/L (ref 21–31)
CREAT SERPL-MCNC: 1.5 MG/DL (ref 0.7–1.3)
EGFRCR SERPLBLD CKD-EPI 2021: 54.6 ML/MIN/1.73M*2
GLUCOSE SERPL-MCNC: 113 MG/DL (ref 70–99)
POTASSIUM SERPL-SCNC: 4.9 MEQ/L (ref 3.5–5.1)
SODIUM SERPL-SCNC: 137 MEQ/L (ref 136–145)

## 2024-05-25 PROCEDURE — 36415 COLL VENOUS BLD VENIPUNCTURE: CPT

## 2024-05-25 PROCEDURE — 80048 BASIC METABOLIC PNL TOTAL CA: CPT

## 2024-07-27 ENCOUNTER — TRANSCRIBE ORDERS (OUTPATIENT)
Dept: LAB | Facility: HOSPITAL | Age: 54
End: 2024-07-27

## 2024-07-27 ENCOUNTER — APPOINTMENT (OUTPATIENT)
Dept: LAB | Facility: HOSPITAL | Age: 54
End: 2024-07-27
Attending: INTERNAL MEDICINE
Payer: COMMERCIAL

## 2024-07-27 DIAGNOSIS — N17.9 ACUTE KIDNEY FAILURE, UNSPECIFIED (CMS/HCC): ICD-10-CM

## 2024-07-27 DIAGNOSIS — N17.9 ACUTE KIDNEY FAILURE, UNSPECIFIED (CMS/HCC): Primary | ICD-10-CM

## 2024-07-27 LAB
ANION GAP SERPL CALC-SCNC: 8 MEQ/L (ref 3–15)
BACTERIA URNS QL MICRO: 3 /HPF
BASOPHILS # BLD: 0.07 K/UL (ref 0.01–0.1)
BASOPHILS NFR BLD: 0.7 %
BILIRUB UR QL STRIP.AUTO: NEGATIVE MG/DL
BUN SERPL-MCNC: 25 MG/DL (ref 7–25)
CALCIUM SERPL-MCNC: 9.2 MG/DL (ref 8.6–10.3)
CHLORIDE SERPL-SCNC: 103 MEQ/L (ref 98–107)
CLARITY UR REFRACT.AUTO: CLEAR
CO2 SERPL-SCNC: 24 MEQ/L (ref 21–31)
COLOR UR AUTO: YELLOW
CREAT SERPL-MCNC: 1.6 MG/DL (ref 0.7–1.3)
CREAT UR-MCNC: 89.1 MG/DL
DIFFERENTIAL METHOD BLD: ABNORMAL
EGFRCR SERPLBLD CKD-EPI 2021: 50.6 ML/MIN/1.73M*2
EOSINOPHIL # BLD: 0.39 K/UL (ref 0.04–0.54)
EOSINOPHIL NFR BLD: 4.1 %
ERYTHROCYTE [DISTWIDTH] IN BLOOD BY AUTOMATED COUNT: 12.9 % (ref 11.6–14.4)
GLUCOSE SERPL-MCNC: 129 MG/DL (ref 70–99)
GLUCOSE UR STRIP.AUTO-MCNC: NEGATIVE MG/DL
HCT VFR BLD AUTO: 33.8 % (ref 40.1–51)
HGB BLD-MCNC: 11.2 G/DL (ref 13.7–17.5)
HGB UR QL STRIP.AUTO: NEGATIVE
HYALINE CASTS #/AREA URNS LPF: ABNORMAL /LPF
IMM GRANULOCYTES # BLD AUTO: 0.08 K/UL (ref 0–0.08)
IMM GRANULOCYTES NFR BLD AUTO: 0.9 %
KETONES UR STRIP.AUTO-MCNC: NEGATIVE MG/DL
LEUKOCYTE ESTERASE UR QL STRIP.AUTO: NEGATIVE
LYMPHOCYTES # BLD: 2.59 K/UL (ref 1.2–3.5)
LYMPHOCYTES NFR BLD: 27.6 %
MCH RBC QN AUTO: 27.7 PG (ref 28–33.2)
MCHC RBC AUTO-ENTMCNC: 33.1 G/DL (ref 32.2–36.5)
MCV RBC AUTO: 83.5 FL (ref 83–98)
MONOCYTES # BLD: 0.71 K/UL (ref 0.3–1)
MONOCYTES NFR BLD: 7.6 %
MUCOUS THREADS URNS QL MICRO: ABNORMAL /LPF
NEUTROPHILS # BLD: 5.56 K/UL (ref 1.7–7)
NEUTS SEG NFR BLD: 59.1 %
NITRITE UR QL STRIP.AUTO: NEGATIVE
NRBC BLD-RTO: 0 %
PDW BLD AUTO: 9.5 FL (ref 9.4–12.4)
PH UR STRIP.AUTO: 5.5 [PH]
PHOSPHATE SERPL-MCNC: 3 MG/DL (ref 2.4–4.7)
PLATELET # BLD AUTO: 273 K/UL (ref 150–350)
POTASSIUM SERPL-SCNC: 4.6 MEQ/L (ref 3.5–5.1)
PROT UR QL STRIP.AUTO: NEGATIVE
PROT UR-MCNC: 6 MG/DL
PROT/CREAT UR: 0.07 MG/G CREAT
RBC # BLD AUTO: 4.05 M/UL (ref 4.5–5.8)
RBC #/AREA URNS HPF: ABNORMAL /HPF
SODIUM SERPL-SCNC: 135 MEQ/L (ref 136–145)
SP GR UR REFRACT.AUTO: 1.01
SQUAMOUS URNS QL MICRO: ABNORMAL /HPF
UROBILINOGEN UR STRIP-ACNC: 0.2 EU/DL
WBC # BLD AUTO: 9.4 K/UL (ref 3.8–10.5)
WBC #/AREA URNS HPF: ABNORMAL /HPF

## 2024-07-27 PROCEDURE — 36415 COLL VENOUS BLD VENIPUNCTURE: CPT

## 2024-07-27 PROCEDURE — 80048 BASIC METABOLIC PNL TOTAL CA: CPT

## 2024-07-27 PROCEDURE — 82570 ASSAY OF URINE CREATININE: CPT

## 2024-07-27 PROCEDURE — 81001 URINALYSIS AUTO W/SCOPE: CPT

## 2024-07-27 PROCEDURE — 84100 ASSAY OF PHOSPHORUS: CPT

## 2024-07-27 PROCEDURE — 85025 COMPLETE CBC W/AUTO DIFF WBC: CPT

## 2025-02-08 ENCOUNTER — APPOINTMENT (OUTPATIENT)
Dept: LAB | Facility: HOSPITAL | Age: 55
End: 2025-02-08
Attending: NURSE PRACTITIONER
Payer: COMMERCIAL

## 2025-02-08 ENCOUNTER — TRANSCRIBE ORDERS (OUTPATIENT)
Dept: LAB | Facility: HOSPITAL | Age: 55
End: 2025-02-08

## 2025-02-08 DIAGNOSIS — N18.31 CHRONIC KIDNEY DISEASE, STAGE 3A (CMS/HCC): ICD-10-CM

## 2025-02-08 DIAGNOSIS — N18.31 CHRONIC KIDNEY DISEASE, STAGE 3A (CMS/HCC): Primary | ICD-10-CM

## 2025-02-08 LAB
ALBUMIN SERPL-MCNC: 4 G/DL (ref 3.5–5.7)
ANION GAP SERPL CALC-SCNC: 6 MEQ/L (ref 3–15)
BACTERIA URNS QL MICRO: ABNORMAL /HPF
BASOPHILS # BLD: 0.05 K/UL (ref 0.01–0.1)
BASOPHILS NFR BLD: 0.6 %
BILIRUB UR QL STRIP.AUTO: NEGATIVE MG/DL
BUN SERPL-MCNC: 41 MG/DL (ref 7–25)
CALCIUM SERPL-MCNC: 8.7 MG/DL (ref 8.6–10.3)
CALCIUM SERPL-MCNC: 8.7 MG/DL (ref 8.6–10.3)
CHLORIDE SERPL-SCNC: 106 MEQ/L (ref 98–107)
CLARITY UR REFRACT.AUTO: CLEAR
CO2 SERPL-SCNC: 25 MEQ/L (ref 21–31)
COLOR UR AUTO: YELLOW
CREAT SERPL-MCNC: 1.7 MG/DL (ref 0.7–1.3)
CREAT UR-MCNC: 95.4 MG/DL
DIFFERENTIAL METHOD BLD: ABNORMAL
EGFRCR SERPLBLD CKD-EPI 2021: 47 ML/MIN/1.73M*2
EOSINOPHIL # BLD: 0.3 K/UL (ref 0.04–0.54)
EOSINOPHIL NFR BLD: 3.6 %
ERYTHROCYTE [DISTWIDTH] IN BLOOD BY AUTOMATED COUNT: 13 % (ref 11.6–14.4)
GLUCOSE SERPL-MCNC: 107 MG/DL (ref 70–99)
GLUCOSE UR STRIP.AUTO-MCNC: >=1000 MG/DL
HCT VFR BLD AUTO: 35.8 % (ref 40.1–51)
HGB BLD-MCNC: 11.6 G/DL (ref 13.7–17.5)
HGB UR QL STRIP.AUTO: NEGATIVE
HYALINE CASTS #/AREA URNS LPF: ABNORMAL /LPF
IMM GRANULOCYTES # BLD AUTO: 0.05 K/UL (ref 0–0.08)
IMM GRANULOCYTES NFR BLD AUTO: 0.6 %
KETONES UR STRIP.AUTO-MCNC: NEGATIVE MG/DL
LEUKOCYTE ESTERASE UR QL STRIP.AUTO: NEGATIVE
LYMPHOCYTES # BLD: 2.17 K/UL (ref 1.2–3.5)
LYMPHOCYTES NFR BLD: 26 %
MCH RBC QN AUTO: 28 PG (ref 28–33.2)
MCHC RBC AUTO-ENTMCNC: 32.4 G/DL (ref 32.2–36.5)
MCV RBC AUTO: 86.5 FL (ref 83–98)
MONOCYTES # BLD: 0.71 K/UL (ref 0.3–1)
MONOCYTES NFR BLD: 8.5 %
MUCOUS THREADS URNS QL MICRO: ABNORMAL /LPF
NEUTROPHILS # BLD: 5.06 K/UL (ref 1.7–7)
NEUTS SEG NFR BLD: 60.7 %
NITRITE UR QL STRIP.AUTO: NEGATIVE
NRBC BLD-RTO: 0 %
PH UR STRIP.AUTO: 5.5 [PH]
PHOSPHATE SERPL-MCNC: 2.9 MG/DL (ref 2.4–4.7)
PLATELET # BLD AUTO: 281 K/UL (ref 150–350)
PMV BLD AUTO: 10.3 FL (ref 9.4–12.4)
POTASSIUM SERPL-SCNC: 5 MEQ/L (ref 3.5–5.1)
PROT UR QL STRIP.AUTO: NEGATIVE
PROT UR-MCNC: 13 MG/DL
PROT/CREAT UR: 0.14 MG/G CREAT
PTH-INTACT SERPL-MCNC: 113.8 PG/ML (ref 12–88)
RBC # BLD AUTO: 4.14 M/UL (ref 4.5–5.8)
RBC #/AREA URNS HPF: ABNORMAL /HPF
SODIUM SERPL-SCNC: 137 MEQ/L (ref 136–145)
SP GR UR REFRACT.AUTO: 1.02
SQUAMOUS URNS QL MICRO: ABNORMAL /HPF
UROBILINOGEN UR STRIP-ACNC: 0.2 EU/DL
WBC # BLD AUTO: 8.34 K/UL (ref 3.8–10.5)
WBC #/AREA URNS HPF: ABNORMAL /HPF

## 2025-02-08 PROCEDURE — 36415 COLL VENOUS BLD VENIPUNCTURE: CPT

## 2025-02-08 PROCEDURE — 81001 URINALYSIS AUTO W/SCOPE: CPT

## 2025-02-08 PROCEDURE — 85025 COMPLETE CBC W/AUTO DIFF WBC: CPT

## 2025-02-08 PROCEDURE — 84156 ASSAY OF PROTEIN URINE: CPT

## 2025-02-08 PROCEDURE — 83970 ASSAY OF PARATHORMONE: CPT

## 2025-02-08 PROCEDURE — 80069 RENAL FUNCTION PANEL: CPT
